# Patient Record
Sex: MALE | Race: WHITE | NOT HISPANIC OR LATINO | Employment: FULL TIME | URBAN - METROPOLITAN AREA
[De-identification: names, ages, dates, MRNs, and addresses within clinical notes are randomized per-mention and may not be internally consistent; named-entity substitution may affect disease eponyms.]

---

## 2018-11-12 ENCOUNTER — LAB VISIT (OUTPATIENT)
Dept: LAB | Facility: HOSPITAL | Age: 38
End: 2018-11-12
Attending: NEUROLOGICAL SURGERY
Payer: COMMERCIAL

## 2018-11-12 ENCOUNTER — OFFICE VISIT (OUTPATIENT)
Dept: NEUROSURGERY | Facility: CLINIC | Age: 38
End: 2018-11-12
Payer: COMMERCIAL

## 2018-11-12 VITALS
SYSTOLIC BLOOD PRESSURE: 139 MMHG | DIASTOLIC BLOOD PRESSURE: 73 MMHG | WEIGHT: 302.38 LBS | HEART RATE: 86 BPM | TEMPERATURE: 99 F

## 2018-11-12 DIAGNOSIS — D32.9 MENINGIOMA: Primary | ICD-10-CM

## 2018-11-12 DIAGNOSIS — D32.9 MENINGIOMA: ICD-10-CM

## 2018-11-12 LAB
CORTIS SERPL-MCNC: 6.6 UG/DL
FSH SERPL-ACNC: 2.5 MIU/ML
LH SERPL-ACNC: 5 MIU/ML
PROLACTIN SERPL IA-MCNC: 18.4 NG/ML
T4 FREE SERPL-MCNC: 0.89 NG/DL
TESTOST SERPL-MCNC: 333 NG/DL
TSH SERPL DL<=0.005 MIU/L-ACNC: 1.7 UIU/ML

## 2018-11-12 PROCEDURE — 82533 TOTAL CORTISOL: CPT

## 2018-11-12 PROCEDURE — 82024 ASSAY OF ACTH: CPT

## 2018-11-12 PROCEDURE — 84305 ASSAY OF SOMATOMEDIN: CPT

## 2018-11-12 PROCEDURE — 99204 OFFICE O/P NEW MOD 45 MIN: CPT | Mod: S$GLB,,, | Performed by: NEUROLOGICAL SURGERY

## 2018-11-12 PROCEDURE — 36415 COLL VENOUS BLD VENIPUNCTURE: CPT

## 2018-11-12 PROCEDURE — 83002 ASSAY OF GONADOTROPIN (LH): CPT

## 2018-11-12 PROCEDURE — 84439 ASSAY OF FREE THYROXINE: CPT

## 2018-11-12 PROCEDURE — 83001 ASSAY OF GONADOTROPIN (FSH): CPT

## 2018-11-12 PROCEDURE — 99999 PR PBB SHADOW E&M-NEW PATIENT-LVL III: CPT | Mod: PBBFAC,,, | Performed by: NEUROLOGICAL SURGERY

## 2018-11-12 PROCEDURE — 84443 ASSAY THYROID STIM HORMONE: CPT

## 2018-11-12 PROCEDURE — 84403 ASSAY OF TOTAL TESTOSTERONE: CPT

## 2018-11-12 PROCEDURE — 84146 ASSAY OF PROLACTIN: CPT

## 2018-11-12 RX ORDER — DIAZEPAM 10 MG/1
TABLET ORAL
COMMUNITY
Start: 2018-08-24 | End: 2018-12-18 | Stop reason: ALTCHOICE

## 2018-11-12 NOTE — PROGRESS NOTES
This office note has been dictated.  November 12, 2018    Allan Cantor M.D.  [address], [address]  [place] [address]    RE:  KARISSA FIGUEROA  [facility] No.:  [id number]    Dear Allan:    Karissa Figueroa was seen in neurosurgical consultation at the office this morning.    As you know, he is a 38-year-old man who began to complain of increased   headache several months ago.  Headaches are primarily occipital, but do project   forward toward the frontal area.  With the headaches, he also began to complain   of erectile dysfunction and pituitary study, hormonal studies were done.    Prolactin was borderline and this led to MRI of the brain being done showing a   pituitary area tumor.  He was then referred for neurosurgical evaluation.    Headaches have apparently been present for a long time, but increased in the   last several months.  He has noted no visual difficulties, no loss of peripheral   vision, no blurring or double vision.  He has had reduced hearing since he was   in [employer].  He has noted no difficulty speaking or swallowing.  No focal   weakness or numbness in extremities, difficulty with gait or balance. Past   medical history includes a spinal fusion for spinal trauma when he was in the   [employer].  He has no specific medical diseases such as diabetes or   hypertension.  He is a large individual.  He says his weight has remained   stable.  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  He is .    On examination, he is a well-developed, large, heavyset white male who is alert   and cooperative.  Examination of the head shows no specific tenderness over the   scalp or over the occipital nerves.  Eyes show full extraocular movements.    There is no nystagmus.  Pupils are equal and reactive to light.  Fundi show   clear disk margins.  He saw fingers moving in both peripheral visual fields.    Hearing is somewhat decreased, apparently  symmetrical.  He is moving the neck   freely.  On neurological examination, he is speaking clearly, provides a good   history.  Affect is normal.  Finger-to-nose was done well.  Gait is   unremarkable.  The remainder of the cranial nerve examination is normal.  He   shows normal facial sensation and movement.  The tongue protrudes in midline.    Strength in extremities is good.  Sensation normal.  Deep tendon reflexes are   symmetrical except the right patella is somewhat decreased.    MRI of the brain was done at [facility] in [place] on   08/24/18.  The brain is normally formed.  Ventricular size is normal.  The   pituitary gland and stalk appear unremarkable.  There is a small mass on the   tuberculum sellae which is the same density as brain on noncontrast scan and   intensely enhances.  This does not come back to the chiasm, but does separate   the optic nerves.  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    RECOMMENDATIONS:  The tumor is beginning to compress the optic nerve and removal   should be considered.  This would need to be done through a small craniotomy.    The patient would probably be out of work for about six weeks, although perhaps   he could be released to light duty earlier.  He would like to consider having   this done toward the end of December.  He will discuss this with his boss at   work and I will wait to hear from him as to how he wishes to proceed.    Thank you very much for the opportunity to see him in neurosurgical   consultation.    Sincerely yours,            ARTURO/WHITNEY  dd: 11/12/2018 10:01:37 (CST)  td: 11/13/2018 04:51:19 (CST)  Doc ID   #[id number]  Job ID #[id number]    CC: Salvatore Figueroa

## 2018-11-12 NOTE — LETTER
November 12, 2018      Allan Chambers MD  [address]  [facility]  [place] [address]           [facility] - Neurosurgery 7th Fl  [address]  [place] [address]  Phone: [phone]          Patient: Salvatore Figueroa   MR Number: [id number]   YOB: 1980   Date of Visit: 11/12/2018       Dear Dr. Allan Chambers:    Thank you for referring Salvatore Figueroa to me for evaluation. Attached you will find relevant portions of my assessment and plan of care.    If you have questions, please do not hesitate to call me. I look forward to following Salvatore Figueroa along with you.    Sincerely,    Miller Tucker MD    Enclosure  CC:  No Recipients    If you would like to receive this communication electronically, please contact [email or web address] or [phone] to request more information on [facility] Link access.    For providers and/or their staff who would like to refer a patient to [facility], please contact us through our one-stop-shop provider referral line, Alfred Madrigal, at [phone].    If you feel you have received this communication in error or would no longer like to receive these types of communications, please e-mail [email or web address]

## 2018-11-13 LAB — ACTH PLAS-MCNC: 52 PG/ML

## 2018-11-15 LAB
IGF-I SERPL-MCNC: 142 NG/ML (ref 48–292)
IGF-I Z-SCORE SERPL: 0 SD

## 2018-11-23 ENCOUNTER — TELEPHONE (OUTPATIENT)
Dept: NEUROSURGERY | Facility: CLINIC | Age: 38
End: 2018-11-23

## 2018-11-23 DIAGNOSIS — D32.9 MENINGIOMA: Primary | ICD-10-CM

## 2018-12-14 ENCOUNTER — ANESTHESIA EVENT (OUTPATIENT)
Dept: SURGERY | Facility: HOSPITAL | Age: 38
DRG: 027 | End: 2018-12-14
Payer: COMMERCIAL

## 2018-12-17 ENCOUNTER — TELEPHONE (OUTPATIENT)
Dept: NEUROSURGERY | Facility: CLINIC | Age: 38
End: 2018-12-17

## 2018-12-17 NOTE — TELEPHONE ENCOUNTER
----- Message from Donaldo Mckeon sent at 12/17/2018  8:35 AM CST -----  Contact: Pearl ( spouse ) @ [phone]  Caller requesting a return call from CECELIA regarding the lab order for pre op, pls call to discuss

## 2018-12-17 NOTE — TELEPHONE ENCOUNTER
SW PTS WIFE, LAB ORDERS WERE DISCUSSED AND ARE BEING DONE THIS MORNING. WILL BE HERE FOR MRI IN THE AM AT 8.

## 2018-12-18 ENCOUNTER — HOSPITAL ENCOUNTER (OUTPATIENT)
Dept: RADIOLOGY | Facility: HOSPITAL | Age: 38
Discharge: HOME | End: 2018-12-18
Attending: NEUROLOGICAL SURGERY
Payer: COMMERCIAL

## 2018-12-18 DIAGNOSIS — D32.9 MENINGIOMA: ICD-10-CM

## 2018-12-18 PROCEDURE — A9585 GADOBUTROL INJECTION: HCPCS | Performed by: NEUROLOGICAL SURGERY

## 2018-12-18 PROCEDURE — 70553 MRI BRAIN STEM W/O & W/DYE: CPT | Mod: TC

## 2018-12-18 PROCEDURE — 25500020 PHARM REV CODE 255: Performed by: NEUROLOGICAL SURGERY

## 2018-12-18 PROCEDURE — 70553 MRI BRAIN STEM W/O & W/DYE: CPT | Mod: 26,,, | Performed by: RADIOLOGY

## 2018-12-18 RX ORDER — GADOBUTROL 604.72 MG/ML
10 INJECTION INTRAVENOUS
Status: COMPLETED | OUTPATIENT
Start: 2018-12-18 | End: 2018-12-18

## 2018-12-18 RX ADMIN — GADOBUTROL 10 ML: 604.72 INJECTION INTRAVENOUS at 08:12

## 2018-12-18 NOTE — ANESTHESIA PREPROCEDURE EVALUATION
[facility]  Anesthesia Pre-Operative Evaluation         Patient Name: Salvatore Figueroa  YOB: 1980  MRN: [id number]    SUBJECTIVE:     Pre-operative evaluation for Procedure(s) (LRB):  CRANIOTOMY, USING FRAMELESS STEREOTAXY (Right)     12/18/2018    Salvatore Figueroa is a 38 y.o. male w/ a significant PMHx of recent meningioma dx who presents for the above procedure.    There is no problem list on file for this patient.      Review of patient's allergies indicates:  No Known Allergies    Current Inpatient Medications:      No current facility-administered medications on file prior to encounter.      Current Outpatient Medications on File Prior to Encounter   Medication Sig Dispense Refill    diazePAM (VALIUM) 10 MG Tab          Past Surgical History:   Procedure Laterality Date    HAND SURGERY Right 2008    SPINE SURGERY  2001       Social History     Socioeconomic History    Marital status:      Spouse name: Not on file    Number of children: Not on file    Years of education: Not on file    Highest education level: Not on file   Social Needs    Financial resource strain: Not on file    Food insecurity - worry: Not on file    Food insecurity - inability: Not on file    Transportation needs - medical: Not on file    Transportation needs - non-medical: Not on file   Occupational History    Not on file   Tobacco Use    Smoking status: Never Smoker    Smokeless tobacco: Never Used   Substance and Sexual Activity    Alcohol use: No     Frequency: Never    Drug use: No    Sexual activity: Yes     Partners: Female   Other Topics Concern    Not on file   Social History Narrative    Not on file       OBJECTIVE:     Vital Signs Range (Last 24H):         CBC:   Lab Results   Component Value Date    WBC 5.81 01/29/2007    HGB 14.3 01/29/2007    HCT 44.2 01/29/2007    MCV 82.9 01/29/2007     01/29/2007       CMP:   CMP  Sodium   Date Value Ref Range Status    01/29/2007 138 136 - 145 mMol/l Final     Potassium   Date Value Ref Range Status   01/29/2007 4.3 3.3 - 5.3 mMol/l Final     Chloride   Date Value Ref Range Status   01/29/2007 105 95 - 110 mMol/l Final     CO2   Date Value Ref Range Status   01/29/2007 26 23.0 - 29.0 mEq/L Final     Glucose   Date Value Ref Range Status   01/29/2007 104 70 - 110 mg/dl Final     BUN, Bld   Date Value Ref Range Status   01/29/2007 10 5 - 23 mg/dl Final     Creatinine   Date Value Ref Range Status   01/29/2007 1.4 0.5 - 1.4 mg/dl Final     Calcium   Date Value Ref Range Status   01/29/2007 9.2 8.7 - 10.5 mg/dl Final     Total Protein   Date Value Ref Range Status   01/29/2007 7.6 6.0 - 8.4 gm/dl Final     Albumin   Date Value Ref Range Status   01/29/2007 4.5 3.5 - 5.2 g/dl Final     Total Bilirubin   Date Value Ref Range Status   01/29/2007 0.6 0.1 - 1.0 mg/dl Final     Comment:     These are the guidelines recommended by the .  Especially  for infants and newborns, interpretation of results should be based  on gestational age, weight and in agreement with clinical  observations.  .  Premature Infant recommended reference range (Bilirubin, Total)  Up to 24 hours.............<8.0 mg/dl  Up to 48 hours............<12.0 mg/dl  3-5 days..................<15.0 mg/dl  6-29 days.................<15.0 mg/dl       Alkaline Phosphatase   Date Value Ref Range Status   01/29/2007 74 55 - 135 U/L Final     AST   Date Value Ref Range Status   01/29/2007 17 0 - 37 U/L Final     ALT   Date Value Ref Range Status   01/29/2007 26 0 - 40 U/L Final       INR:  No results for input(s): PT, INR, PROTIME, APTT in the last 72 hours.    Diagnostic Studies:   MRI brain 11/23/18  1.3 cm enhancing extra-axial mass centered over planum sphenoidale and suprasellar cistern most consistent with a meningioma as detailed above.    Incidental mild Chiari 1 malformation    8 mm pineal cyst    Mild paranasal sinus disease.    EKG: No recent studies  available.    2D ECHO:  No results found for this or any previous visit.      ASSESSMENT/PLAN:         Anesthesia Evaluation    I have reviewed the Patient Summary Reports.     I have reviewed the Medications.     Review of Systems  Anesthesia Hx:  History of prior surgery of interest to airway management or planning: Denies Family Hx of Anesthesia complications.   Denies Personal Hx of Anesthesia complications.   Hematology/Oncology:  Hematology Normal        Cardiovascular:  Cardiovascular Normal     Pulmonary:  Pulmonary Normal    Hepatic/GI:  Hepatic/GI Normal    Musculoskeletal:  Musculoskeletal Normal    Neurological:   meningioma   Endocrine:  Endocrine Normal        Physical Exam      Chest/Lungs:  Chest/Lungs Findings: Normal Respiratory Rate, Clear to auscultation     Heart/Vascular:  Heart Findings: Rate: Normal  Rhythm: Regular Rhythm     Abdomen:  Abdomen Findings:  Normal, Soft, Nontender            Anesthesia Plan  Type of Anesthesia, risks & benefits discussed:  Anesthesia Type:  general  Patient's Preference:   Intra-op Monitoring Plan: standard ASA monitors and arterial line  Intra-op Monitoring Plan Comments:   Post Op Pain Control Plan: multimodal analgesia, IV/PO Opioids PRN and per primary service following discharge from PACU  Post Op Pain Control Plan Comments:   Induction:   IV  Beta Blocker:  Patient is not currently on a Beta-Blocker (No further documentation required).       Informed Consent: Patient understands risks and agrees with Anesthesia plan.  Questions answered. Anesthesia consent signed with patient.  ASA Score: 2     Day of Surgery Review of History & Physical:    H&P update referred to the surgeon.         Ready For Surgery From Anesthesia Perspective.

## 2018-12-19 ENCOUNTER — ANESTHESIA (OUTPATIENT)
Dept: SURGERY | Facility: HOSPITAL | Age: 38
DRG: 027 | End: 2018-12-19
Payer: COMMERCIAL

## 2018-12-19 ENCOUNTER — HOSPITAL ENCOUNTER (INPATIENT)
Facility: HOSPITAL | Age: 38
LOS: 4 days | Discharge: HOME | DRG: 027 | End: 2018-12-23
Attending: NEUROLOGICAL SURGERY | Admitting: NEUROLOGICAL SURGERY
Payer: COMMERCIAL

## 2018-12-19 DIAGNOSIS — D32.9 MENINGIOMA: Primary | ICD-10-CM

## 2018-12-19 PROBLEM — I10 ESSENTIAL HYPERTENSION: Status: ACTIVE | Noted: 2018-12-19

## 2018-12-19 LAB
ABO + RH BLD: NORMAL
ANION GAP SERPL CALC-SCNC: 8 MMOL/L
APTT BLDCRRT: 23.3 SEC
BASOPHILS # BLD AUTO: 0.01 K/UL
BASOPHILS NFR BLD: 0.1 %
BLD GP AB SCN CELLS X3 SERPL QL: NORMAL
BUN SERPL-MCNC: 12 MG/DL
CALCIUM SERPL-MCNC: 8.6 MG/DL
CHLORIDE SERPL-SCNC: 111 MMOL/L
CO2 SERPL-SCNC: 19 MMOL/L
CREAT SERPL-MCNC: 1.4 MG/DL
DIFFERENTIAL METHOD: ABNORMAL
EOSINOPHIL # BLD AUTO: 0 K/UL
EOSINOPHIL NFR BLD: 0.1 %
ERYTHROCYTE [DISTWIDTH] IN BLOOD BY AUTOMATED COUNT: 13 %
EST. GFR  (AFRICAN AMERICAN): >60 ML/MIN/1.73 M^2
EST. GFR  (NON AFRICAN AMERICAN): >60 ML/MIN/1.73 M^2
GLUCOSE SERPL-MCNC: 123 MG/DL (ref 70–110)
GLUCOSE SERPL-MCNC: 170 MG/DL
HCO3 UR-SCNC: 19.6 MMOL/L (ref 24–28)
HCT VFR BLD AUTO: 42.1 %
HCT VFR BLD CALC: 39 %PCV (ref 36–54)
HGB BLD-MCNC: 14 G/DL
IMM GRANULOCYTES # BLD AUTO: 0.04 K/UL
IMM GRANULOCYTES NFR BLD AUTO: 0.5 %
INR PPP: 1
LYMPHOCYTES # BLD AUTO: 1.1 K/UL
LYMPHOCYTES NFR BLD: 12.2 %
MCH RBC QN AUTO: 27.9 PG
MCHC RBC AUTO-ENTMCNC: 33.3 G/DL
MCV RBC AUTO: 84 FL
MONOCYTES # BLD AUTO: 0.2 K/UL
MONOCYTES NFR BLD: 1.9 %
NEUTROPHILS # BLD AUTO: 7.5 K/UL
NEUTROPHILS NFR BLD: 85.2 %
NRBC BLD-RTO: 0 /100 WBC
PCO2 BLDA: 33.8 MMHG (ref 35–45)
PH SMN: 7.37 [PH] (ref 7.35–7.45)
PLATELET # BLD AUTO: 221 K/UL
PMV BLD AUTO: 10.2 FL
PO2 BLDA: 103 MMHG (ref 80–100)
POC BE: -6 MMOL/L
POC IONIZED CALCIUM: 1.13 MMOL/L (ref 1.06–1.42)
POC SATURATED O2: 98 % (ref 95–100)
POC TCO2: 21 MMOL/L (ref 23–27)
POTASSIUM BLD-SCNC: 4.9 MMOL/L (ref 3.5–5.1)
POTASSIUM SERPL-SCNC: 4.8 MMOL/L
PROTHROMBIN TIME: 10.1 SEC
RBC # BLD AUTO: 5.01 M/UL
SAMPLE: ABNORMAL
SODIUM BLD-SCNC: 140 MMOL/L (ref 136–145)
SODIUM SERPL-SCNC: 138 MMOL/L
WBC # BLD AUTO: 8.84 K/UL

## 2018-12-19 PROCEDURE — 99222 1ST HOSP IP/OBS MODERATE 55: CPT | Mod: ,,, | Performed by: PSYCHIATRY & NEUROLOGY

## 2018-12-19 PROCEDURE — 61512 CRNEC TREPH EXC MNGIOMA STTL: CPT | Mod: ,,, | Performed by: NEUROLOGICAL SURGERY

## 2018-12-19 PROCEDURE — 25000003 PHARM REV CODE 250: Performed by: PSYCHIATRY & NEUROLOGY

## 2018-12-19 PROCEDURE — 27201037 HC PRESSURE MONITORING SET UP

## 2018-12-19 PROCEDURE — 12000002 HC ACUTE/MED SURGE SEMI-PRIVATE ROOM

## 2018-12-19 PROCEDURE — 99222 PR INITIAL HOSPITAL CARE,LEVL II: ICD-10-PCS | Mod: ,,, | Performed by: PSYCHIATRY & NEUROLOGY

## 2018-12-19 PROCEDURE — 80048 BASIC METABOLIC PNL TOTAL CA: CPT

## 2018-12-19 PROCEDURE — 63600175 PHARM REV CODE 636 W HCPCS

## 2018-12-19 PROCEDURE — 82962 GLUCOSE BLOOD TEST: CPT | Performed by: NEUROLOGICAL SURGERY

## 2018-12-19 PROCEDURE — 71000039 HC RECOVERY, EACH ADD'L HOUR: Performed by: NEUROLOGICAL SURGERY

## 2018-12-19 PROCEDURE — 25000003 PHARM REV CODE 250: Performed by: NEUROLOGICAL SURGERY

## 2018-12-19 PROCEDURE — 88331 PATH CONSLTJ SURG 1 BLK 1SPC: CPT | Mod: 26,,, | Performed by: PATHOLOGY

## 2018-12-19 PROCEDURE — C1713 ANCHOR/SCREW BN/BN,TIS/BN: HCPCS | Performed by: NEUROLOGICAL SURGERY

## 2018-12-19 PROCEDURE — 25000003 PHARM REV CODE 250: Performed by: STUDENT IN AN ORGANIZED HEALTH CARE EDUCATION/TRAINING PROGRAM

## 2018-12-19 PROCEDURE — 25000003 PHARM REV CODE 250: Performed by: ANESTHESIOLOGY

## 2018-12-19 PROCEDURE — 37000009 HC ANESTHESIA EA ADD 15 MINS: Performed by: NEUROLOGICAL SURGERY

## 2018-12-19 PROCEDURE — 88307 TISSUE EXAM BY PATHOLOGIST: CPT | Mod: 26,,, | Performed by: PATHOLOGY

## 2018-12-19 PROCEDURE — 86850 RBC ANTIBODY SCREEN: CPT

## 2018-12-19 PROCEDURE — 61781 PR STEREOTACTIC COMP ASSIST PROC,CRANIAL,INTRADURAL: ICD-10-PCS | Mod: ,,, | Performed by: NEUROLOGICAL SURGERY

## 2018-12-19 PROCEDURE — 88331 PATH CONSLTJ SURG 1 BLK 1SPC: CPT | Performed by: PATHOLOGY

## 2018-12-19 PROCEDURE — 36000713 HC OR TIME LEV V EA ADD 15 MIN: Performed by: NEUROLOGICAL SURGERY

## 2018-12-19 PROCEDURE — 61512 PR EXCIS SUPRATENT MENINGIOMA: ICD-10-PCS | Mod: ,,, | Performed by: NEUROLOGICAL SURGERY

## 2018-12-19 PROCEDURE — 63600175 PHARM REV CODE 636 W HCPCS: Performed by: STUDENT IN AN ORGANIZED HEALTH CARE EDUCATION/TRAINING PROGRAM

## 2018-12-19 PROCEDURE — 63600175 PHARM REV CODE 636 W HCPCS: Performed by: NEUROLOGICAL SURGERY

## 2018-12-19 PROCEDURE — 85610 PROTHROMBIN TIME: CPT

## 2018-12-19 PROCEDURE — 88331 TISSUE SPECIMEN TO PATHOLOGY - SURGERY: ICD-10-PCS | Mod: 26,,, | Performed by: PATHOLOGY

## 2018-12-19 PROCEDURE — 94761 N-INVAS EAR/PLS OXIMETRY MLT: CPT

## 2018-12-19 PROCEDURE — C9113 INJ PANTOPRAZOLE SODIUM, VIA: HCPCS | Performed by: STUDENT IN AN ORGANIZED HEALTH CARE EDUCATION/TRAINING PROGRAM

## 2018-12-19 PROCEDURE — 69990 PR MICROSURG TECHNIQUES,REQ OPER MICROSCOPE: ICD-10-PCS | Mod: 59,,, | Performed by: NEUROLOGICAL SURGERY

## 2018-12-19 PROCEDURE — 37000008 HC ANESTHESIA 1ST 15 MINUTES: Performed by: NEUROLOGICAL SURGERY

## 2018-12-19 PROCEDURE — D9220A PRA ANESTHESIA: Mod: ,,, | Performed by: ANESTHESIOLOGY

## 2018-12-19 PROCEDURE — 85730 THROMBOPLASTIN TIME PARTIAL: CPT

## 2018-12-19 PROCEDURE — 88307 TISSUE SPECIMEN TO PATHOLOGY - SURGERY: ICD-10-PCS | Mod: 26,,, | Performed by: PATHOLOGY

## 2018-12-19 PROCEDURE — 20000000 HC ICU ROOM

## 2018-12-19 PROCEDURE — 85025 COMPLETE CBC W/AUTO DIFF WBC: CPT

## 2018-12-19 PROCEDURE — C1729 CATH, DRAINAGE: HCPCS | Performed by: NEUROLOGICAL SURGERY

## 2018-12-19 PROCEDURE — 36415 COLL VENOUS BLD VENIPUNCTURE: CPT

## 2018-12-19 PROCEDURE — 63600175 PHARM REV CODE 636 W HCPCS: Performed by: ANESTHESIOLOGY

## 2018-12-19 PROCEDURE — 69990 MICROSURGERY ADD-ON: CPT | Mod: 59,,, | Performed by: NEUROLOGICAL SURGERY

## 2018-12-19 PROCEDURE — 36000712 HC OR TIME LEV V 1ST 15 MIN: Performed by: NEUROLOGICAL SURGERY

## 2018-12-19 PROCEDURE — 27000221 HC OXYGEN, UP TO 24 HOURS

## 2018-12-19 PROCEDURE — 61781 SCAN PROC CRANIAL INTRA: CPT | Mod: ,,, | Performed by: NEUROLOGICAL SURGERY

## 2018-12-19 PROCEDURE — 86920 COMPATIBILITY TEST SPIN: CPT

## 2018-12-19 PROCEDURE — 71000033 HC RECOVERY, INTIAL HOUR: Performed by: NEUROLOGICAL SURGERY

## 2018-12-19 PROCEDURE — 36620 INSERTION CATHETER ARTERY: CPT | Mod: 59,,, | Performed by: ANESTHESIOLOGY

## 2018-12-19 PROCEDURE — 27201423 OPTIME MED/SURG SUP & DEVICES STERILE SUPPLY: Performed by: NEUROLOGICAL SURGERY

## 2018-12-19 DEVICE — IMPLANTABLE DEVICE: Type: IMPLANTABLE DEVICE | Site: CRANIAL | Status: FUNCTIONAL

## 2018-12-19 DEVICE — PLATE BONE 2X2 HOLE SM BOX: Type: IMPLANTABLE DEVICE | Site: CRANIAL | Status: FUNCTIONAL

## 2018-12-19 RX ORDER — SODIUM CHLORIDE 9 MG/ML
INJECTION, SOLUTION INTRAVENOUS CONTINUOUS PRN
Status: DISCONTINUED | OUTPATIENT
Start: 2018-12-19 | End: 2018-12-19

## 2018-12-19 RX ORDER — DEXAMETHASONE SODIUM PHOSPHATE 4 MG/ML
INJECTION, SOLUTION INTRA-ARTICULAR; INTRALESIONAL; INTRAMUSCULAR; INTRAVENOUS; SOFT TISSUE
Status: DISCONTINUED | OUTPATIENT
Start: 2018-12-19 | End: 2018-12-19

## 2018-12-19 RX ORDER — ONDANSETRON 8 MG/1
8 TABLET, ORALLY DISINTEGRATING ORAL EVERY 4 HOURS PRN
Status: DISCONTINUED | OUTPATIENT
Start: 2018-12-19 | End: 2018-12-23 | Stop reason: HOSPADM

## 2018-12-19 RX ORDER — ONDANSETRON 2 MG/ML
INJECTION INTRAMUSCULAR; INTRAVENOUS
Status: DISCONTINUED | OUTPATIENT
Start: 2018-12-19 | End: 2018-12-19

## 2018-12-19 RX ORDER — OXYBUTYNIN CHLORIDE 5 MG/1
5 TABLET ORAL 3 TIMES DAILY
Status: DISCONTINUED | OUTPATIENT
Start: 2018-12-19 | End: 2018-12-19

## 2018-12-19 RX ORDER — MUPIROCIN 20 MG/G
OINTMENT TOPICAL
Status: DISCONTINUED | OUTPATIENT
Start: 2018-12-19 | End: 2018-12-19

## 2018-12-19 RX ORDER — PROPOFOL 10 MG/ML
VIAL (ML) INTRAVENOUS CONTINUOUS PRN
Status: DISCONTINUED | OUTPATIENT
Start: 2018-12-19 | End: 2018-12-19

## 2018-12-19 RX ORDER — SUCCINYLCHOLINE CHLORIDE 20 MG/ML
INJECTION INTRAMUSCULAR; INTRAVENOUS
Status: DISCONTINUED | OUTPATIENT
Start: 2018-12-19 | End: 2018-12-19

## 2018-12-19 RX ORDER — SODIUM CHLORIDE 9 MG/ML
INJECTION, SOLUTION INTRAVENOUS CONTINUOUS
Status: DISCONTINUED | OUTPATIENT
Start: 2018-12-19 | End: 2018-12-19

## 2018-12-19 RX ORDER — ACETAMINOPHEN 10 MG/ML
INJECTION, SOLUTION INTRAVENOUS
Status: DISCONTINUED | OUTPATIENT
Start: 2018-12-19 | End: 2018-12-19

## 2018-12-19 RX ORDER — IBUPROFEN 200 MG
24 TABLET ORAL
Status: DISCONTINUED | OUTPATIENT
Start: 2018-12-19 | End: 2018-12-23 | Stop reason: HOSPADM

## 2018-12-19 RX ORDER — PHENYTOIN SODIUM 50 MG/ML
INJECTION INTRAMUSCULAR; INTRAVENOUS
Status: DISCONTINUED | OUTPATIENT
Start: 2018-12-19 | End: 2018-12-19

## 2018-12-19 RX ORDER — ACETAMINOPHEN 650 MG/1
650 SUPPOSITORY RECTAL EVERY 6 HOURS PRN
Status: DISCONTINUED | OUTPATIENT
Start: 2018-12-19 | End: 2018-12-23 | Stop reason: HOSPADM

## 2018-12-19 RX ORDER — NICARDIPINE HYDROCHLORIDE 0.2 MG/ML
INJECTION INTRAVENOUS
Status: DISPENSED
Start: 2018-12-19 | End: 2018-12-20

## 2018-12-19 RX ORDER — METHOCARBAMOL 500 MG/1
500 TABLET, FILM COATED ORAL ONCE
Status: COMPLETED | OUTPATIENT
Start: 2018-12-19 | End: 2018-12-19

## 2018-12-19 RX ORDER — HYDROCODONE BITARTRATE AND ACETAMINOPHEN 5; 325 MG/1; MG/1
1 TABLET ORAL EVERY 4 HOURS PRN
Status: DISCONTINUED | OUTPATIENT
Start: 2018-12-19 | End: 2018-12-20

## 2018-12-19 RX ORDER — BACITRACIN ZINC 500 UNIT/G
OINTMENT (GRAM) TOPICAL
Status: DISCONTINUED | OUTPATIENT
Start: 2018-12-19 | End: 2018-12-19

## 2018-12-19 RX ORDER — GLUCAGON 1 MG
1 KIT INJECTION
Status: DISCONTINUED | OUTPATIENT
Start: 2018-12-19 | End: 2018-12-23 | Stop reason: HOSPADM

## 2018-12-19 RX ORDER — LIDOCAINE HCL/PF 100 MG/5ML
SYRINGE (ML) INTRAVENOUS
Status: DISCONTINUED | OUTPATIENT
Start: 2018-12-19 | End: 2018-12-19

## 2018-12-19 RX ORDER — ACETAMINOPHEN 325 MG/1
650 TABLET ORAL EVERY 6 HOURS PRN
Status: DISCONTINUED | OUTPATIENT
Start: 2018-12-19 | End: 2018-12-23 | Stop reason: HOSPADM

## 2018-12-19 RX ORDER — SODIUM CHLORIDE 0.9 % (FLUSH) 0.9 %
3 SYRINGE (ML) INJECTION
Status: DISCONTINUED | OUTPATIENT
Start: 2018-12-19 | End: 2018-12-19

## 2018-12-19 RX ORDER — ONDANSETRON 2 MG/ML
4 INJECTION INTRAMUSCULAR; INTRAVENOUS ONCE
Status: COMPLETED | OUTPATIENT
Start: 2018-12-19 | End: 2018-12-19

## 2018-12-19 RX ORDER — MIDAZOLAM HYDROCHLORIDE 1 MG/ML
INJECTION, SOLUTION INTRAMUSCULAR; INTRAVENOUS
Status: DISCONTINUED | OUTPATIENT
Start: 2018-12-19 | End: 2018-12-19

## 2018-12-19 RX ORDER — ONDANSETRON 2 MG/ML
INJECTION INTRAMUSCULAR; INTRAVENOUS
Status: COMPLETED
Start: 2018-12-19 | End: 2018-12-19

## 2018-12-19 RX ORDER — LIDOCAINE HYDROCHLORIDE AND EPINEPHRINE 10; 10 MG/ML; UG/ML
INJECTION, SOLUTION INFILTRATION; PERINEURAL
Status: DISCONTINUED | OUTPATIENT
Start: 2018-12-19 | End: 2018-12-19

## 2018-12-19 RX ORDER — FENTANYL CITRATE 50 UG/ML
INJECTION, SOLUTION INTRAMUSCULAR; INTRAVENOUS
Status: DISCONTINUED | OUTPATIENT
Start: 2018-12-19 | End: 2018-12-19

## 2018-12-19 RX ORDER — BACITRACIN 50000 [IU]/1
INJECTION, POWDER, FOR SOLUTION INTRAMUSCULAR
Status: DISCONTINUED | OUTPATIENT
Start: 2018-12-19 | End: 2018-12-19

## 2018-12-19 RX ORDER — PANTOPRAZOLE SODIUM 40 MG/10ML
40 INJECTION, POWDER, LYOPHILIZED, FOR SOLUTION INTRAVENOUS DAILY
Status: DISCONTINUED | OUTPATIENT
Start: 2018-12-19 | End: 2018-12-19

## 2018-12-19 RX ORDER — HYDROMORPHONE HYDROCHLORIDE 1 MG/ML
0.2 INJECTION, SOLUTION INTRAMUSCULAR; INTRAVENOUS; SUBCUTANEOUS EVERY 5 MIN PRN
Status: DISCONTINUED | OUTPATIENT
Start: 2018-12-19 | End: 2018-12-20

## 2018-12-19 RX ORDER — OXYBUTYNIN CHLORIDE 5 MG/1
TABLET ORAL
Status: DISPENSED
Start: 2018-12-19 | End: 2018-12-20

## 2018-12-19 RX ORDER — IBUPROFEN 200 MG
16 TABLET ORAL
Status: DISCONTINUED | OUTPATIENT
Start: 2018-12-19 | End: 2018-12-23 | Stop reason: HOSPADM

## 2018-12-19 RX ORDER — ROCURONIUM BROMIDE 10 MG/ML
INJECTION, SOLUTION INTRAVENOUS
Status: DISCONTINUED | OUTPATIENT
Start: 2018-12-19 | End: 2018-12-19

## 2018-12-19 RX ORDER — OXYBUTYNIN CHLORIDE 5 MG/1
5 TABLET ORAL 2 TIMES DAILY
Status: COMPLETED | OUTPATIENT
Start: 2018-12-19 | End: 2018-12-20

## 2018-12-19 RX ORDER — MUPIROCIN 20 MG/G
1 OINTMENT TOPICAL 2 TIMES DAILY
Status: DISCONTINUED | OUTPATIENT
Start: 2018-12-19 | End: 2018-12-23 | Stop reason: HOSPADM

## 2018-12-19 RX ORDER — PROPOFOL 10 MG/ML
VIAL (ML) INTRAVENOUS
Status: DISCONTINUED | OUTPATIENT
Start: 2018-12-19 | End: 2018-12-19

## 2018-12-19 RX ORDER — NICARDIPINE HYDROCHLORIDE 0.2 MG/ML
1 INJECTION INTRAVENOUS CONTINUOUS
Status: DISCONTINUED | OUTPATIENT
Start: 2018-12-19 | End: 2018-12-20

## 2018-12-19 RX ORDER — MUPIROCIN 20 MG/G
1 OINTMENT TOPICAL
Status: DISCONTINUED | OUTPATIENT
Start: 2018-12-19 | End: 2018-12-19

## 2018-12-19 RX ORDER — SODIUM CHLORIDE 9 MG/ML
INJECTION, SOLUTION INTRAVENOUS CONTINUOUS
Status: DISCONTINUED | OUTPATIENT
Start: 2018-12-19 | End: 2018-12-23 | Stop reason: HOSPADM

## 2018-12-19 RX ORDER — HEPARIN SODIUM 5000 [USP'U]/ML
5000 INJECTION, SOLUTION INTRAVENOUS; SUBCUTANEOUS EVERY 8 HOURS
Status: DISCONTINUED | OUTPATIENT
Start: 2018-12-20 | End: 2018-12-23 | Stop reason: HOSPADM

## 2018-12-19 RX ADMIN — PHENYTOIN SODIUM 500 MG: 50 INJECTION INTRAMUSCULAR; INTRAVENOUS at 09:12

## 2018-12-19 RX ADMIN — PROPOFOL 150 MCG/KG/MIN: 10 INJECTION, EMULSION INTRAVENOUS at 08:12

## 2018-12-19 RX ADMIN — ONDANSETRON 4 MG: 2 INJECTION INTRAMUSCULAR; INTRAVENOUS at 05:12

## 2018-12-19 RX ADMIN — REMIFENTANIL HYDROCHLORIDE 0.25 MCG/KG/MIN: 1 INJECTION, POWDER, LYOPHILIZED, FOR SOLUTION INTRAVENOUS at 12:12

## 2018-12-19 RX ADMIN — SODIUM CHLORIDE: 0.9 INJECTION, SOLUTION INTRAVENOUS at 08:12

## 2018-12-19 RX ADMIN — SODIUM CHLORIDE, SODIUM GLUCONATE, SODIUM ACETATE, POTASSIUM CHLORIDE, MAGNESIUM CHLORIDE, SODIUM PHOSPHATE, DIBASIC, AND POTASSIUM PHOSPHATE: .53; .5; .37; .037; .03; .012; .00082 INJECTION, SOLUTION INTRAVENOUS at 08:12

## 2018-12-19 RX ADMIN — DEXAMETHASONE SODIUM PHOSPHATE 12 MG: 4 INJECTION, SOLUTION INTRAMUSCULAR; INTRAVENOUS at 08:12

## 2018-12-19 RX ADMIN — HYDROCODONE BITARTRATE AND ACETAMINOPHEN 1 TABLET: 5; 325 TABLET ORAL at 04:12

## 2018-12-19 RX ADMIN — CEFTRIAXONE 2 G: 2 INJECTION, SOLUTION INTRAVENOUS at 08:12

## 2018-12-19 RX ADMIN — HYDROCODONE BITARTRATE AND ACETAMINOPHEN 1 TABLET: 5; 325 TABLET ORAL at 09:12

## 2018-12-19 RX ADMIN — LIDOCAINE HYDROCHLORIDE 100 MG: 20 INJECTION, SOLUTION INTRAVENOUS at 08:12

## 2018-12-19 RX ADMIN — OXYBUTYNIN CHLORIDE 5 MG: 5 TABLET ORAL at 05:12

## 2018-12-19 RX ADMIN — PANTOPRAZOLE SODIUM 40 MG: 40 INJECTION, POWDER, FOR SOLUTION INTRAVENOUS at 02:12

## 2018-12-19 RX ADMIN — REMIFENTANIL HYDROCHLORIDE 0.3 MCG/KG/MIN: 1 INJECTION, POWDER, LYOPHILIZED, FOR SOLUTION INTRAVENOUS at 08:12

## 2018-12-19 RX ADMIN — MUPIROCIN 1 G: 20 OINTMENT TOPICAL at 08:12

## 2018-12-19 RX ADMIN — ROCURONIUM BROMIDE 5 MG: 10 INJECTION, SOLUTION INTRAVENOUS at 08:12

## 2018-12-19 RX ADMIN — SODIUM CHLORIDE: 0.9 INJECTION, SOLUTION INTRAVENOUS at 02:12

## 2018-12-19 RX ADMIN — MUPIROCIN: 20 OINTMENT TOPICAL at 06:12

## 2018-12-19 RX ADMIN — ONDANSETRON 4 MG: 2 INJECTION INTRAMUSCULAR; INTRAVENOUS at 01:12

## 2018-12-19 RX ADMIN — MIDAZOLAM HYDROCHLORIDE 2 MG: 1 INJECTION, SOLUTION INTRAMUSCULAR; INTRAVENOUS at 08:12

## 2018-12-19 RX ADMIN — PROPOFOL 200 MG: 10 INJECTION, EMULSION INTRAVENOUS at 08:12

## 2018-12-19 RX ADMIN — FENTANYL CITRATE 100 MCG: 50 INJECTION, SOLUTION INTRAMUSCULAR; INTRAVENOUS at 08:12

## 2018-12-19 RX ADMIN — NICARDIPINE HYDROCHLORIDE 5 MG/HR: 0.2 INJECTION, SOLUTION INTRAVENOUS at 10:12

## 2018-12-19 RX ADMIN — ACETAMINOPHEN 650 MG: 325 TABLET, FILM COATED ORAL at 08:12

## 2018-12-19 RX ADMIN — METHOCARBAMOL TABLETS 500 MG: 500 TABLET, COATED ORAL at 05:12

## 2018-12-19 RX ADMIN — NICARDIPINE HYDROCHLORIDE 2.5 MG/HR: 0.2 INJECTION, SOLUTION INTRAVENOUS at 02:12

## 2018-12-19 RX ADMIN — ACETAMINOPHEN 1000 MG: 10 INJECTION, SOLUTION INTRAVENOUS at 08:12

## 2018-12-19 RX ADMIN — SUCCINYLCHOLINE CHLORIDE 120 MG: 20 INJECTION, SOLUTION INTRAMUSCULAR; INTRAVENOUS at 08:12

## 2018-12-19 RX ADMIN — SODIUM CHLORIDE 0.2 MCG/KG/MIN: 9 INJECTION, SOLUTION INTRAVENOUS at 08:12

## 2018-12-19 NOTE — ANESTHESIA PROCEDURE NOTES
Arterial    Diagnosis: meningioma    Patient location during procedure: done in OR  Procedure start time: 12/19/2018 8:12 AM  Timeout: 12/19/2018 8:11 AM  Procedure end time: 12/19/2018 8:16 AM  Staffing  Anesthesiologist: Bam Pedraza Jr., MD  Resident/CRNA: Polly Scales MD  Performed: resident/CRNA   Anesthesiologist was present at the time of the procedure.  Preanesthetic Checklist  Completed: patient identified, site marked, surgical consent, pre-op evaluation, timeout performed, IV checked, risks and benefits discussed, monitors and equipment checked and anesthesia consent givenArterial  Skin Prep: chlorhexidine gluconate  Local Infiltration: none  Orientation: left  Location: radial  Catheter Size: 20 G  Catheter placement by Anatomical landmarks. Heme positive aspiration all ports.Insertion Attempts: 1  Assessment  Dressing: secured with tape and tegaderm and secured with tape  Patient: Tolerated well

## 2018-12-19 NOTE — H&P
[facility]  Neurocritical Care  History & Physical    Admit Date: 12/19/2018  Service Date: 12/19/2018  Length of Stay: 0    Subjective:     Chief Complaint: Mass    History of Present Illness: 38 year old male presenting s/p meningioma resection. Patient presented with persistent headaches and erectile dysfunction and was found to have tuberculum sellae meningioma 1.3 cm tuberculum sellae meningioma. Surgery was reportedly uneventful. Admitted to [facility] with SG drain in place.    Past Medical History:   Diagnosis Date    Arthritis     Chronic back pain     Chronic back pain     Meningioma      Past Surgical History:   Procedure Laterality Date    HAND SURGERY Right 2008    SPINE SURGERY  2001      No current facility-administered medications on file prior to encounter.      No current outpatient medications on file prior to encounter.      Allergies: Patient has no known allergies.    History reviewed. No pertinent family history.  Social History     Tobacco Use    Smoking status: Never Smoker    Smokeless tobacco: Never Used   Substance Use Topics    Alcohol use: No     Frequency: Never    Drug use: No     Review of Systems   Constitutional: Negative for fever.   HENT: Negative for trouble swallowing.    Eyes: Negative for visual disturbance.   Respiratory: Negative for shortness of breath.    Cardiovascular: Negative for chest pain.   Gastrointestinal: Negative for nausea.   Genitourinary: Positive for difficulty urinating.   Musculoskeletal: Positive for back pain. Negative for neck pain and neck stiffness.   Neurological: Positive for headaches. Negative for weakness and numbness.     Objective:     Vitals:    Temp: 98.7 °F (37.1 °C)  Pulse: 110  Rhythm: sinus tachycardia  BP: (!) 144/79  MAP (mmHg): 105  Resp: (!) 30  SpO2: 99 %  O2 Device (Oxygen Therapy): Room Humidifier    Temp  Min: 98.4 °F (36.9 °C)  Max: 98.7 °F (37.1 °C)  Pulse  Min: 77  Max: 110  BP  Min: 119/57  Max:  149/79  MAP (mmHg)  Min: 82  Max: 105  Resp  Min: 18  Max: 30  SpO2  Min: 92 %  Max: 100 %    No intake/output data recorded.           Physical Exam   Constitutional: He appears well-developed and well-nourished. He appears lethargic.   HENT:   SG drain in place   Eyes: EOM are normal. Pupils are equal, round, and reactive to light.   Neck: Normal range of motion. Neck supple.   Cardiovascular: Normal rate.   Pulmonary/Chest: Effort normal.   Abdominal: Soft. He exhibits no distension.   Neurological: He has normal strength. He appears lethargic. No cranial nerve deficit or sensory deficit. GCS eye subscore is 4. GCS verbal subscore is 5. GCS motor subscore is 6.         Assessment/Plan:     Cardiac/Vascular   Essential hypertension    Sarah  foreign     Oncology   Meningioma    tuberculum sellae meningioma  -s/p resection  -SG drain in place  -HOB>30  --140  -pain management  -PT/OT/ST  -admit to [facility] for neurochecks  -sarah  -abx while drain in place           The patient is being Prophylaxed for:  Venous Thromboembolism with: Mechanical or Chemical  Stress Ulcer with: Not Applicable   Ventilator Pneumonia with: not applicable    Activity Orders          None        No Order    Deon Barboza MD  Neurocritical Care  [facility]

## 2018-12-19 NOTE — HPI
38 year old male presenting s/p meningioma resection. Patient presented with persistent headaches and erectile dysfunction and was found to have tuberculum sellae meningioma 1.3 cm tuberculum sellae meningioma. Surgery was reportedly uneventful. Admitted to [facility] with SG drain in place.

## 2018-12-19 NOTE — H&P
History and Physical  Neurosurgery    Admit Date: 12/19/2018  LOS: 0    Code Status: No Order     CC: <principal problem not specified>    SUBJECTIVE:     History of Present Illness: 37 yo male with known sellar mass presenting for elective craniotomy for resection.    Neurologically stable.           OBJECTIVE:   Vital Signs (Most Recent):   Temp: 98.4 °F (36.9 °C) (12/19/18 0633)  Pulse: 77 (12/19/18 0633)  Resp: 18 (12/19/18 0633)  BP: 126/63 (12/19/18 0633)  SpO2: 97 % (12/19/18 0633)    Vital Signs (24h Range):   Temp:  [98.4 °F (36.9 °C)] 98.4 °F (36.9 °C)  Pulse:  [77] 77  Resp:  [18] 18  SpO2:  [97 %] 97 %  BP: (126)/(63) 126/63      I & O (Last 24h):  No intake or output data in the 24 hours ending 12/19/18 0806    Physical Exam:  General: well developed, well nourished, no distress.   Head: normocephalic, atraumatic  Cervical Spine: No midline tenderness to palpation.  Thoracolumbosacral Spine: No midline tenderness to palpation.  GCS: Motor: 6/Verbal: 5/Eyes: 4 GCS Total: 15  Mental Status: Awake, Alert, Oriented x 4  Language: No aphasia  Speech: No dysarthria  Facial Droop: None   Cranial nerves: CN III-XII grossly intact.  Eyes: Pupils equal and reactive to light. Intact Conjugate horizontal and vertical pursuit. No nystagmus. No gaze deviation.   Pulmonary: No distress.  Sensory: No deficit.  Propioception: No deficit in 1st digit of toe bilaterally.  Rectal Tone: Not tested.  Drift: None.  Upper Extremity Ataxia: No Dysmetria Bilaterally  Lower Extremity Ataxia: Not tested.  Dysdiadochokinesia: Not tested.  Reflexes: 2+ patellar bilaterally.  Vazquez: Absent  Clonus: Absent  Babinski: Absent  Romberg: Not Tested  Pulses: Brisk and symmetric radial, DP and tibial pulses.  Motor Strength:    Strength  Shoulder Abduction Elbow Extension Elbow Flexion Wrist Extension Wrist Flexion Finger Opposition Finger Add Finger Abd   Upper: R 5/5 5/5 5/5 5/5 5/5 5/5 5/5 5/5    L 5/5 5/5 5/5 5/5 5/5 5/5 5/5 5/5      Hip Flexion Knee Extension Knee  Flexion Ankle Dflexion Ankle Pflexion EHL     Lower: R 5/5 5/5 5/5 5/5 5/5 5/5      L 5/5 5/5 5/5 5/5 5/5 5/5           Lines/Drains/Airway:          Nutrition/Tube Feeds:   Current Diet Order   Procedures    Diet NPO       Labs:  ABG: No results for input(s): PH, PO2, PCO2, HCO3, POCSATURATED, BE in the last 24 hours.  BMP:No results for input(s): NA, K, CL, CO2, BUN, CREATININE, GLU, MG, PHOS in the last 24 hours.  LFT:   Lab Results   Component Value Date    AST 17 01/29/2007    ALT 26 01/29/2007    ALKPHOS 74 01/29/2007    BILITOT 0.6 01/29/2007    ALBUMIN 4.5 01/29/2007    PROT 7.6 01/29/2007     CBC:   Lab Results   Component Value Date    WBC 5.81 01/29/2007    HGB 14.3 01/29/2007    HCT 44.2 01/29/2007    MCV 82.9 01/29/2007     01/29/2007     Microbiology x 7d:   Microbiology Results (last 7 days)     ** No results found for the last 168 hours. **            ASSESSMENT/PLAN:   37 yo male with known sellar mass presenting for elective craniotomy for resection.    --To OR for resection of sellar mass.   --Admit to neurosurgery under floor status.

## 2018-12-19 NOTE — PROGRESS NOTES
Dr. Barboza at bedside, aware of pts complaints of back pain and discomfort r/t maza catheter.  Dr. Castaneda states he will put in an order for medication for back spasms.  Will continue to monitor

## 2018-12-19 NOTE — TRANSFER OF CARE
"Anesthesia Transfer of Care Note    Patient: Salvatore Figueroa    Procedure(s) Performed: Procedure(s) (LRB):  CRANIOTOMY, USING FRAMELESS STEREOTAXY, Meningioma (Right)    Patient location: PACU    Anesthesia Type: general    Transport from OR: Transported from OR on 2-3 L/min O2 by NC with adequate spontaneous ventilation    Post pain: adequate analgesia    Post assessment: no apparent anesthetic complications    Post vital signs: stable    Level of consciousness: awake    Nausea/Vomiting: no nausea/vomiting    Complications: none    Transfer of care protocol was followed      Last vitals:   Visit Vitals  /74   Pulse 95   Temp 37.1 °C (98.7 °F) (Axillary)   Resp 18   Ht 6' 1" (1.854 m)   Wt 134.3 kg (296 lb)   SpO2 (!) 92%   BMI 39.05 kg/m²     "

## 2018-12-19 NOTE — ASSESSMENT & PLAN NOTE
tuberculum sellae meningioma  -s/p resection  -SG drain in place  -HOB>30  --140  -pain management  -PT/OT/ST  -admit to [facility] for neurochecks  -rodo  -abx while drain in place

## 2018-12-19 NOTE — SUBJECTIVE & OBJECTIVE
Past Medical History:   Diagnosis Date    Arthritis     Chronic back pain     Chronic back pain     Meningioma      Past Surgical History:   Procedure Laterality Date    HAND SURGERY Right 2008    SPINE SURGERY  2001      No current facility-administered medications on file prior to encounter.      No current outpatient medications on file prior to encounter.      Allergies: Patient has no known allergies.    History reviewed. No pertinent family history.  Social History     Tobacco Use    Smoking status: Never Smoker    Smokeless tobacco: Never Used   Substance Use Topics    Alcohol use: No     Frequency: Never    Drug use: No     Review of Systems   Constitutional: Negative for fever.   HENT: Negative for trouble swallowing.    Eyes: Negative for visual disturbance.   Respiratory: Negative for shortness of breath.    Cardiovascular: Negative for chest pain.   Gastrointestinal: Negative for nausea.   Genitourinary: Positive for difficulty urinating.   Musculoskeletal: Positive for back pain. Negative for neck pain and neck stiffness.   Neurological: Positive for headaches. Negative for weakness and numbness.     Objective:     Vitals:    Temp: 98.7 °F (37.1 °C)  Pulse: 110  Rhythm: sinus tachycardia  BP: (!) 144/79  MAP (mmHg): 105  Resp: (!) 30  SpO2: 99 %  O2 Device (Oxygen Therapy): Room Humidifier    Temp  Min: 98.4 °F (36.9 °C)  Max: 98.7 °F (37.1 °C)  Pulse  Min: 77  Max: 110  BP  Min: 119/57  Max: 149/79  MAP (mmHg)  Min: 82  Max: 105  Resp  Min: 18  Max: 30  SpO2  Min: 92 %  Max: 100 %    No intake/output data recorded.           Physical Exam   Constitutional: He appears well-developed and well-nourished. He appears lethargic.   HENT:   SG drain in place   Eyes: EOM are normal. Pupils are equal, round, and reactive to light.   Neck: Normal range of motion. Neck supple.   Cardiovascular: Normal rate.   Pulmonary/Chest: Effort normal.   Abdominal: Soft. He exhibits no distension.   Neurological: He  has normal strength. He appears lethargic. No cranial nerve deficit or sensory deficit. GCS eye subscore is 4. GCS verbal subscore is 5. GCS motor subscore is 6.

## 2018-12-19 NOTE — PRE-PROCEDURE INSTRUCTIONS
"Spoke with Patient.  NPO, medication, and pre-op instructions reviewed.  Denies previous problems with Anesthesia.  Has chronic back pain and has been having chronic headaches.  Stated that he has "a high threshold for pain and nausea."  Verbalized understanding of instructions.    "

## 2018-12-19 NOTE — BRIEF OP NOTE
[facility]  Brief Operative Note    SUMMARY     Surgery Date: 12/19/2018     Surgeon(s) and Role:     * Miller Tucker MD - Primary     * Kike Elaine MD - Resident - Assisting        Pre-op Diagnosis:  Meningioma [D32.9]    Post-op Diagnosis:  Post-Op Diagnosis Codes:     * Meningioma [D32.9]    Procedure:    Right frontotemporal craniotomy, excision of tuberculum sellae meningioma with neuronavigation and microsurgery.    Anesthesia: General    Description of Procedure: craniotomy for meningioma    Description of the findings of the procedure: Tumor uncerneath optic nerves and chiasm, above pituitary stalk.    Estimated Blood Loss: 300 mL         Specimens:   Specimen (12h ago, onward)    Start     Ordered    12/19/18 1234  Specimen to Pathology - Surgery  Once     Comments:  1) Sellar Region Tumor (frozen) 2) Sellar Region Tumor (perm)     Start Status   12/19/18 1234 Collected (12/19/18 1234)       12/19/18 1234

## 2018-12-20 PROBLEM — D72.829 LEUKOCYTOSIS: Status: ACTIVE | Noted: 2018-12-20

## 2018-12-20 PROBLEM — R50.9 FEVER: Status: ACTIVE | Noted: 2018-12-20

## 2018-12-20 LAB
ANION GAP SERPL CALC-SCNC: 9 MMOL/L
BASOPHILS # BLD AUTO: 0.02 K/UL
BASOPHILS NFR BLD: 0.1 %
BUN SERPL-MCNC: 13 MG/DL
CALCIUM SERPL-MCNC: 8.8 MG/DL
CHLORIDE SERPL-SCNC: 109 MMOL/L
CO2 SERPL-SCNC: 20 MMOL/L
CREAT SERPL-MCNC: 1.2 MG/DL
DIFFERENTIAL METHOD: ABNORMAL
EOSINOPHIL # BLD AUTO: 0 K/UL
EOSINOPHIL NFR BLD: 0 %
ERYTHROCYTE [DISTWIDTH] IN BLOOD BY AUTOMATED COUNT: 13.2 %
EST. GFR  (AFRICAN AMERICAN): >60 ML/MIN/1.73 M^2
EST. GFR  (NON AFRICAN AMERICAN): >60 ML/MIN/1.73 M^2
GLUCOSE SERPL-MCNC: 142 MG/DL
HCT VFR BLD AUTO: 41.9 %
HGB BLD-MCNC: 13.8 G/DL
IMM GRANULOCYTES # BLD AUTO: 0.07 K/UL
IMM GRANULOCYTES NFR BLD AUTO: 0.4 %
LYMPHOCYTES # BLD AUTO: 0.9 K/UL
LYMPHOCYTES NFR BLD: 5.5 %
MCH RBC QN AUTO: 28.1 PG
MCHC RBC AUTO-ENTMCNC: 32.9 G/DL
MCV RBC AUTO: 85 FL
MONOCYTES # BLD AUTO: 1 K/UL
MONOCYTES NFR BLD: 6 %
NEUTROPHILS # BLD AUTO: 14.5 K/UL
NEUTROPHILS NFR BLD: 88 %
NRBC BLD-RTO: 0 /100 WBC
PLATELET # BLD AUTO: 216 K/UL
PMV BLD AUTO: 10 FL
POCT GLUCOSE: 140 MG/DL (ref 70–110)
POCT GLUCOSE: 143 MG/DL (ref 70–110)
POCT GLUCOSE: 144 MG/DL (ref 70–110)
POCT GLUCOSE: 150 MG/DL (ref 70–110)
POCT GLUCOSE: 157 MG/DL (ref 70–110)
POTASSIUM SERPL-SCNC: 3.8 MMOL/L
RBC # BLD AUTO: 4.91 M/UL
SODIUM SERPL-SCNC: 138 MMOL/L
WBC # BLD AUTO: 16.53 K/UL

## 2018-12-20 PROCEDURE — 25000003 PHARM REV CODE 250: Performed by: STUDENT IN AN ORGANIZED HEALTH CARE EDUCATION/TRAINING PROGRAM

## 2018-12-20 PROCEDURE — 20600001 HC STEP DOWN PRIVATE ROOM

## 2018-12-20 PROCEDURE — 85025 COMPLETE CBC W/AUTO DIFF WBC: CPT

## 2018-12-20 PROCEDURE — 99232 SBSQ HOSP IP/OBS MODERATE 35: CPT | Mod: ,,, | Performed by: PSYCHIATRY & NEUROLOGY

## 2018-12-20 PROCEDURE — 63600175 PHARM REV CODE 636 W HCPCS: Performed by: STUDENT IN AN ORGANIZED HEALTH CARE EDUCATION/TRAINING PROGRAM

## 2018-12-20 PROCEDURE — 25000003 PHARM REV CODE 250: Performed by: NURSE PRACTITIONER

## 2018-12-20 PROCEDURE — 63600175 PHARM REV CODE 636 W HCPCS: Performed by: ANESTHESIOLOGY

## 2018-12-20 PROCEDURE — 94799 UNLISTED PULMONARY SVC/PX: CPT

## 2018-12-20 PROCEDURE — 25000003 PHARM REV CODE 250: Performed by: ANESTHESIOLOGY

## 2018-12-20 PROCEDURE — 94761 N-INVAS EAR/PLS OXIMETRY MLT: CPT

## 2018-12-20 PROCEDURE — 80048 BASIC METABOLIC PNL TOTAL CA: CPT

## 2018-12-20 PROCEDURE — 36415 COLL VENOUS BLD VENIPUNCTURE: CPT

## 2018-12-20 PROCEDURE — 99900035 HC TECH TIME PER 15 MIN (STAT)

## 2018-12-20 PROCEDURE — 99232 PR SUBSEQUENT HOSPITAL CARE,LEVL II: ICD-10-PCS | Mod: ,,, | Performed by: PSYCHIATRY & NEUROLOGY

## 2018-12-20 PROCEDURE — 25000003 PHARM REV CODE 250

## 2018-12-20 RX ORDER — OXYCODONE HYDROCHLORIDE 5 MG/1
TABLET ORAL
Status: COMPLETED
Start: 2018-12-20 | End: 2018-12-20

## 2018-12-20 RX ORDER — OXYCODONE HYDROCHLORIDE 5 MG/1
5 TABLET ORAL EVERY 4 HOURS PRN
Status: DISCONTINUED | OUTPATIENT
Start: 2018-12-20 | End: 2018-12-23 | Stop reason: HOSPADM

## 2018-12-20 RX ORDER — DEXAMETHASONE SODIUM PHOSPHATE 4 MG/ML
4 INJECTION, SOLUTION INTRA-ARTICULAR; INTRALESIONAL; INTRAMUSCULAR; INTRAVENOUS; SOFT TISSUE EVERY 6 HOURS
Status: DISCONTINUED | OUTPATIENT
Start: 2018-12-20 | End: 2018-12-22

## 2018-12-20 RX ORDER — HYDRALAZINE HYDROCHLORIDE 20 MG/ML
10 INJECTION INTRAMUSCULAR; INTRAVENOUS EVERY 6 HOURS PRN
Status: DISCONTINUED | OUTPATIENT
Start: 2018-12-20 | End: 2018-12-23 | Stop reason: HOSPADM

## 2018-12-20 RX ORDER — FAMOTIDINE 20 MG/1
20 TABLET, FILM COATED ORAL 2 TIMES DAILY
Status: DISCONTINUED | OUTPATIENT
Start: 2018-12-20 | End: 2018-12-23 | Stop reason: HOSPADM

## 2018-12-20 RX ORDER — LEVETIRACETAM 5 MG/ML
500 INJECTION INTRAVASCULAR EVERY 12 HOURS
Status: DISCONTINUED | OUTPATIENT
Start: 2018-12-20 | End: 2018-12-23 | Stop reason: HOSPADM

## 2018-12-20 RX ADMIN — DEXAMETHASONE SODIUM PHOSPHATE 4 MG: 4 INJECTION, SOLUTION INTRAMUSCULAR; INTRAVENOUS at 11:12

## 2018-12-20 RX ADMIN — OXYBUTYNIN CHLORIDE 5 MG: 5 TABLET ORAL at 08:12

## 2018-12-20 RX ADMIN — OXYCODONE HYDROCHLORIDE 5 MG: 5 TABLET ORAL at 01:12

## 2018-12-20 RX ADMIN — OXYCODONE HYDROCHLORIDE 5 MG: 5 TABLET ORAL at 08:12

## 2018-12-20 RX ADMIN — ACETAMINOPHEN 650 MG: 325 TABLET, FILM COATED ORAL at 12:12

## 2018-12-20 RX ADMIN — OXYCODONE HYDROCHLORIDE 5 MG: 5 TABLET ORAL at 04:12

## 2018-12-20 RX ADMIN — LEVETIRACETAM 500 MG: 5 INJECTION INTRAVENOUS at 09:12

## 2018-12-20 RX ADMIN — PROMETHAZINE HYDROCHLORIDE 6.25 MG: 25 INJECTION INTRAMUSCULAR; INTRAVENOUS at 12:12

## 2018-12-20 RX ADMIN — OXYCODONE HYDROCHLORIDE 5 MG: 5 TABLET ORAL at 10:12

## 2018-12-20 RX ADMIN — OXYCODONE HYDROCHLORIDE 5 MG: 5 TABLET ORAL at 06:12

## 2018-12-20 RX ADMIN — HEPARIN SODIUM 5000 UNITS: 5000 INJECTION, SOLUTION INTRAVENOUS; SUBCUTANEOUS at 01:12

## 2018-12-20 RX ADMIN — FAMOTIDINE 20 MG: 20 TABLET ORAL at 08:12

## 2018-12-20 RX ADMIN — DEXAMETHASONE SODIUM PHOSPHATE 4 MG: 4 INJECTION, SOLUTION INTRAMUSCULAR; INTRAVENOUS at 06:12

## 2018-12-20 RX ADMIN — MUPIROCIN 1 G: 20 OINTMENT TOPICAL at 08:12

## 2018-12-20 RX ADMIN — SODIUM CHLORIDE: 0.9 INJECTION, SOLUTION INTRAVENOUS at 05:12

## 2018-12-20 RX ADMIN — NICARDIPINE HYDROCHLORIDE 7.3 MG/HR: 0.2 INJECTION, SOLUTION INTRAVENOUS at 07:12

## 2018-12-20 RX ADMIN — CEFTRIAXONE 2 G: 2 INJECTION, SOLUTION INTRAVENOUS at 08:12

## 2018-12-20 RX ADMIN — NICARDIPINE HYDROCHLORIDE 9.6 MG/HR: 0.2 INJECTION, SOLUTION INTRAVENOUS at 12:12

## 2018-12-20 RX ADMIN — PROMETHAZINE HYDROCHLORIDE 6.25 MG: 25 INJECTION INTRAMUSCULAR; INTRAVENOUS at 05:12

## 2018-12-20 RX ADMIN — FAMOTIDINE 20 MG: 20 TABLET ORAL at 09:12

## 2018-12-20 RX ADMIN — HYDROCODONE BITARTRATE AND ACETAMINOPHEN 1 TABLET: 5; 325 TABLET ORAL at 01:12

## 2018-12-20 RX ADMIN — HEPARIN SODIUM 5000 UNITS: 5000 INJECTION, SOLUTION INTRAVENOUS; SUBCUTANEOUS at 05:12

## 2018-12-20 RX ADMIN — HEPARIN SODIUM 5000 UNITS: 5000 INJECTION, SOLUTION INTRAVENOUS; SUBCUTANEOUS at 09:12

## 2018-12-20 NOTE — ASSESSMENT & PLAN NOTE
38 M with h/o known tuberculum sella meningioma s/p elective resection.    POD#1 R pterional crani for resection  Continue [facility] admit  q1h neurochecks  Post op CT reviewed  Dex 4q6h w/GI ppx while on steriod  SBP <140  Continue HV drain, abx prophylaxis while HV in place  Ok for diet    Dispo: Continue ICU care

## 2018-12-20 NOTE — PROGRESS NOTES
Oral care provided this morning and face washed. Pt AAOx4. PO morning meds administered as ordered. Swallowing without difficulty. Pt refused breakfast this morning. Nausea resolved. VSS; BP parameters maintained with use of Cardene gtt. Neuro checks intact. RN remains at bedside. Safety maintained. Will continue to monitor and reassess.

## 2018-12-20 NOTE — PLAN OF CARE
Patient in NAD. Patient complains of mild to moderate pain to head relieved with PRN pain medications. Right head incision dressing c/d/i. Hemovac drain in place to full suction to right head. Maintaining SBPs<140 with cardene gtt. Fall precautions maintained. Bed in locked low position side rails up x2. Patient condition stable. Will continue to monitor patient.

## 2018-12-20 NOTE — SUBJECTIVE & OBJECTIVE
Past Medical History:   Diagnosis Date    Arthritis     Chronic back pain     Chronic back pain     Meningioma      Past Surgical History:   Procedure Laterality Date    HAND SURGERY Right 2008    SPINE SURGERY  2001      No current facility-administered medications on file prior to encounter.      No current outpatient medications on file prior to encounter.      Allergies: Patient has no known allergies.    History reviewed. No pertinent family history.  Social History     Tobacco Use    Smoking status: Never Smoker    Smokeless tobacco: Never Used   Substance Use Topics    Alcohol use: No     Frequency: Never    Drug use: No     Review of Systems   Constitutional: Negative for fever.   HENT: Negative for trouble swallowing.    Eyes: Negative for visual disturbance.   Respiratory: Negative for shortness of breath.    Cardiovascular: Negative for chest pain.   Gastrointestinal: Negative for nausea.   Genitourinary: Negative for difficulty urinating.   Musculoskeletal: Positive for back pain. Negative for neck pain and neck stiffness.   Neurological: Positive for headaches. Negative for weakness and numbness.     Objective:     Vitals:    Temp: 99.3 °F (37.4 °C)  Pulse: 107  Rhythm: sinus tachycardia  BP: 135/66  MAP (mmHg): 91  Resp: 20  SpO2: (!) 94 %  O2 Device (Oxygen Therapy): room air    Temp  Min: 98.5 °F (36.9 °C)  Max: 101.3 °F (38.5 °C)  Pulse  Min: 95  Max: 120  BP  Min: 106/50  Max: 158/81  MAP (mmHg)  Min: 72  Max: 107  Resp  Min: 15  Max: 30  SpO2  Min: 92 %  Max: 100 %    12/19 0701 - 12/20 0700  In: 4050.5 [I.V.:3900.5]  Out: 4780 [Urine:4540; Drains:80]   Unmeasured Output  Emesis Occurrence: 1       Physical Exam   Constitutional: He appears well-developed and well-nourished.   HENT:   SG drain in place   Eyes: EOM are normal. Pupils are equal, round, and reactive to light.   Neck: Normal range of motion. Neck supple.   Cardiovascular: Normal rate.   Pulmonary/Chest: Effort normal.    Abdominal: Soft. He exhibits no distension.   Neurological: He is alert. He has normal strength. No cranial nerve deficit or sensory deficit. GCS eye subscore is 4. GCS verbal subscore is 5. GCS motor subscore is 6.

## 2018-12-20 NOTE — PROGRESS NOTES
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     Neurologically stable.        Post-Op Info:  Procedure(s) (LRB):  CRANIOTOMY, USING FRAMELESS STEREOTAXY, Meningioma (Right)   1 Day Post-Op     Interval History: NAEON. C/o nausea around midnight, one episode of emesis, resolving.     Medications:  Continuous Infusions:   sodium chloride 0.9% 100 mL/hr at 12/19/18 1412    nicardipine 9.6 mg/hr (12/20/18 0915)     Scheduled Meds:   cefTRIAXone (ROCEPHIN) IVPB  2 g Intravenous Q12H    dexamethasone  4 mg Intravenous Q6H    famotidine  20 mg Oral BID    heparin (porcine)  5,000 Units Subcutaneous Q8H    levetiracetam IVPB  500 mg Intravenous Q12H    mupirocin  1 g Nasal BID     PRN Meds:acetaminophen, acetaminophen, dextrose 50%, dextrose 50%, glucagon (human recombinant), glucose, glucose, ondansetron, oxyCODONE     Review of Systems  Objective:     Weight: 134.3 kg (296 lb)  Body mass index is 39.05 kg/m².  Vital Signs (Most Recent):  Temp: 98.5 °F (36.9 °C) (12/20/18 0900)  Pulse: 104 (12/20/18 0900)  Resp: 20 (12/20/18 0900)  BP: 139/69 (12/20/18 0900)  SpO2: (!) 94 % (12/20/18 0900) Vital Signs (24h Range):  Temp:  [98.5 °F (36.9 °C)-101.3 °F (38.5 °C)] 98.5 °F (36.9 °C)  Pulse:  [] 104  Resp:  [15-30] 20  SpO2:  [92 %-100 %] 94 %  BP: (106-158)/(50-85) 139/69  Arterial Line BP: ()/(62-83) 140/68     Date 12/20/18 0700 - 12/21/18 0659   Shift 4572-0307 4777-0804 0988-5489 24 Hour Total   INTAKE   P.O. 0   0   I.V.(mL/kg) 2473.5(18.4)   2473.5(18.4)   Shift Total(mL/kg) 2473.5(18.4)   2473.5(18.4)   OUTPUT   Urine(mL/kg/hr) 325   325   Drains 30   30   Shift Total(mL/kg) 355(2.6)   355(2.6)   Weight (kg) 134.3 134.3 134.3 134.3                        Closed/Suction Drain 12/19/18 1312 Right  Accordion 10 Fr. (Active)   Site Description  "Unable to view 12/20/2018  8:00 AM   Dressing Type [place] 12/20/2018  8:00 AM   Dressing Status Clean;Dry;Intact 12/20/2018  8:00 AM   Drainage Serosanguineous 12/20/2018  8:00 AM   Status Other (Comment) 12/20/2018  8:00 AM   Output (mL) 30 mL 12/20/2018  8:00 AM            Urethral Catheter 12/19/18 0825 Straight-tip;Non-latex 16 Fr. (Active)   Site Assessment Clean;Intact 12/20/2018  8:00 AM   Collection Container Urimeter 12/20/2018  8:00 AM   Securement Method secured to top of thigh w/ adhesive device 12/20/2018  8:00 AM   Catheter Care Performed yes 12/20/2018  8:00 AM   Reason for Continuing Urinary Catheterization Post operative 12/20/2018  8:00 AM   CAUTI Prevention Bundle StatLock in place w 1" slack;Intact seal between catheter & drainage tubing;Drainage bag off the floor;Green sheeting clip in use;No dependent loops or kinks;Drainage bag not overfilled (<2/3 full);Drainage bag below bladder 12/20/2018  7:00 AM   Output (mL) 100 mL 12/20/2018  9:00 AM       Neurosurgery Physical Exam   Awake, alert, no acute distress  AOx3  R crani dressing c/d/i with HV  PERRL  EOMI  VF grossly intact  CN II-XII grossly intact  FCx4  No focal deficits    Significant Labs:  Recent Labs   Lab 12/19/18  1353 12/20/18  0551   * 142*    138   K 4.8 3.8   * 109   CO2 19* 20*   BUN 12 13   CREATININE 1.4 1.2   CALCIUM 8.6* 8.8     Recent Labs   Lab 12/19/18  0957 12/19/18  1353 12/20/18  0551   WBC  --  8.84 16.53*   HGB  --  14.0 13.8*   HCT 39 42.1 41.9   PLT  --  221 216     Recent Labs   Lab 12/19/18  0619   INR 1.0   APTT 23.3     Microbiology Results (last 7 days)     ** No results found for the last 168 hours. **        Recent Lab Results       12/20/18  0551   12/20/18  0005   12/19/18  1353        Immature Granulocytes 0.4   0.5     Immature Grans (Abs) 0.07  Comment:  Mild elevation in immature granulocytes is non specific and   can be seen in a variety of conditions including stress " response,   acute inflammation, trauma and pregnancy. Correlation with other   laboratory and clinical findings is essential.     0.04  Comment:  Mild elevation in immature granulocytes is non specific and   can be seen in a variety of conditions including stress response,   acute inflammation, trauma and pregnancy. Correlation with other   laboratory and clinical findings is essential.       Anion Gap 9   8     Baso # 0.02   0.01     Basophil% 0.1   0.1     BUN, Bld 13   12     Calcium 8.8   8.6     Chloride 109   111     CO2 20   19     Creatinine 1.2   1.4     Differential Method Automated   Automated     eGFR if  >60.0   >60.0     eGFR if non  >60.0  Comment:  Calculation used to obtain the estimated glomerular filtration  rate (eGFR) is the CKD-EPI equation.      >60.0  Comment:  Calculation used to obtain the estimated glomerular filtration  rate (eGFR) is the CKD-EPI equation.        Eos # 0.0   0.0     Eosinophil% 0.0   0.1     Glucose 142   170     Gran # (ANC) 14.5   7.5     Gran% 88.0   85.2     Hematocrit 41.9   42.1     Hemoglobin 13.8   14.0     Lymph # 0.9   1.1     Lymph% 5.5   12.2     MCH 28.1   27.9     MCHC 32.9   33.3     MCV 85   84     Mono # 1.0   0.2     Mono% 6.0   1.9     MPV 10.0   10.2     nRBC 0   0     Platelets 216   221     POCT Glucose   157       Potassium 3.8   4.8     RBC 4.91   5.01     RDW 13.2   13.0     Sodium 138   138     WBC 16.53   8.84           Significant Diagnostics:  I have reviewed all pertinent imaging results/findings within the past 24 hours.    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    No acute events overnight. Neurologically intact on exam. Visual exam stable.    --Continue care per primary team.  --Continue levetiracetam 500 bid.  --Continue dexamethasone 4q6.  --Continue chemical PUD prophylaxis while undergoing systemic  glucocorticoid regimen.  --Continue subgaleal hemovac drain to full suction.  --Continue prophylactic antibiotics while hemovac drain in place.  --Pt likely stable for stepdown tomorrow.  --We will continue to monitor closely, please contact us with any questions or concerns.           Caryn Mcclain MD  Neurosurgery  [facility]

## 2018-12-20 NOTE — HPI
39 yo male with known tuberculum sella meningioma presenting for elective craniotomy for resection.     Neurologically stable.

## 2018-12-20 NOTE — SUBJECTIVE & OBJECTIVE
Interval History: NAEON. C/o nausea around midnight, one episode of emesis, resolving.     Medications:  Continuous Infusions:   sodium chloride 0.9% 100 mL/hr at 12/19/18 1412    nicardipine 9.6 mg/hr (12/20/18 0915)     Scheduled Meds:   cefTRIAXone (ROCEPHIN) IVPB  2 g Intravenous Q12H    dexamethasone  4 mg Intravenous Q6H    famotidine  20 mg Oral BID    heparin (porcine)  5,000 Units Subcutaneous Q8H    levetiracetam IVPB  500 mg Intravenous Q12H    mupirocin  1 g Nasal BID     PRN Meds:acetaminophen, acetaminophen, dextrose 50%, dextrose 50%, glucagon (human recombinant), glucose, glucose, ondansetron, oxyCODONE     Review of Systems  Objective:     Weight: 134.3 kg (296 lb)  Body mass index is 39.05 kg/m².  Vital Signs (Most Recent):  Temp: 98.5 °F (36.9 °C) (12/20/18 0900)  Pulse: 104 (12/20/18 0900)  Resp: 20 (12/20/18 0900)  BP: 139/69 (12/20/18 0900)  SpO2: (!) 94 % (12/20/18 0900) Vital Signs (24h Range):  Temp:  [98.5 °F (36.9 °C)-101.3 °F (38.5 °C)] 98.5 °F (36.9 °C)  Pulse:  [] 104  Resp:  [15-30] 20  SpO2:  [92 %-100 %] 94 %  BP: (106-158)/(50-85) 139/69  Arterial Line BP: ()/(62-83) 140/68     Date 12/20/18 0700 - 12/21/18 0659   Shift 8482-7323 4570-4448 6041-6835 24 Hour Total   INTAKE   P.O. 0   0   I.V.(mL/kg) 2473.5(18.4)   2473.5(18.4)   Shift Total(mL/kg) 2473.5(18.4)   2473.5(18.4)   OUTPUT   Urine(mL/kg/hr) 325   325   Drains 30   30   Shift Total(mL/kg) 355(2.6)   355(2.6)   Weight (kg) 134.3 134.3 134.3 134.3                        Closed/Suction Drain 12/19/18 1312 Right  Accordion 10 Fr. (Active)   Site Description Unable to view 12/20/2018  8:00 AM   Dressing Type [place] 12/20/2018  8:00 AM   Dressing Status Clean;Dry;Intact 12/20/2018  8:00 AM   Drainage Serosanguineous 12/20/2018  8:00 AM   Status Other (Comment) 12/20/2018  8:00 AM   Output (mL) 30 mL 12/20/2018  8:00 AM            Urethral Catheter 12/19/18 0825 Straight-tip;Non-latex 16 Fr. (Active)  "  Site Assessment Clean;Intact 12/20/2018  8:00 AM   Collection Container Urimeter 12/20/2018  8:00 AM   Securement Method secured to top of thigh w/ adhesive device 12/20/2018  8:00 AM   Catheter Care Performed yes 12/20/2018  8:00 AM   Reason for Continuing Urinary Catheterization Post operative 12/20/2018  8:00 AM   CAUTI Prevention Bundle StatLock in place w 1" slack;Intact seal between catheter & drainage tubing;Drainage bag off the floor;Green sheeting clip in use;No dependent loops or kinks;Drainage bag not overfilled (<2/3 full);Drainage bag below bladder 12/20/2018  7:00 AM   Output (mL) 100 mL 12/20/2018  9:00 AM       Neurosurgery Physical Exam   Awake, alert, no acute distress  AOx3  R crani dressing c/d/i with HV  PERRL  EOMI  VF grossly intact  CN II-XII grossly intact  FCx4  No focal deficits    Significant Labs:  Recent Labs   Lab 12/19/18  1353 12/20/18  0551   * 142*    138   K 4.8 3.8   * 109   CO2 19* 20*   BUN 12 13   CREATININE 1.4 1.2   CALCIUM 8.6* 8.8     Recent Labs   Lab 12/19/18  0957 12/19/18  1353 12/20/18  0551   WBC  --  8.84 16.53*   HGB  --  14.0 13.8*   HCT 39 42.1 41.9   PLT  --  221 216     Recent Labs   Lab 12/19/18  0619   INR 1.0   APTT 23.3     Microbiology Results (last 7 days)     ** No results found for the last 168 hours. **        Recent Lab Results       12/20/18  0551   12/20/18  0005   12/19/18  1353        Immature Granulocytes 0.4   0.5     Immature Grans (Abs) 0.07  Comment:  Mild elevation in immature granulocytes is non specific and   can be seen in a variety of conditions including stress response,   acute inflammation, trauma and pregnancy. Correlation with other   laboratory and clinical findings is essential.     0.04  Comment:  Mild elevation in immature granulocytes is non specific and   can be seen in a variety of conditions including stress response,   acute inflammation, trauma and pregnancy. Correlation with other   laboratory and " clinical findings is essential.       Anion Gap 9   8     Baso # 0.02   0.01     Basophil% 0.1   0.1     BUN, Bld 13   12     Calcium 8.8   8.6     Chloride 109   111     CO2 20   19     Creatinine 1.2   1.4     Differential Method Automated   Automated     eGFR if  >60.0   >60.0     eGFR if non  >60.0  Comment:  Calculation used to obtain the estimated glomerular filtration  rate (eGFR) is the CKD-EPI equation.      >60.0  Comment:  Calculation used to obtain the estimated glomerular filtration  rate (eGFR) is the CKD-EPI equation.        Eos # 0.0   0.0     Eosinophil% 0.0   0.1     Glucose 142   170     Gran # (ANC) 14.5   7.5     Gran% 88.0   85.2     Hematocrit 41.9   42.1     Hemoglobin 13.8   14.0     Lymph # 0.9   1.1     Lymph% 5.5   12.2     MCH 28.1   27.9     MCHC 32.9   33.3     MCV 85   84     Mono # 1.0   0.2     Mono% 6.0   1.9     MPV 10.0   10.2     nRBC 0   0     Platelets 216   221     POCT Glucose   157       Potassium 3.8   4.8     RBC 4.91   5.01     RDW 13.2   13.0     Sodium 138   138     WBC 16.53   8.84           Significant Diagnostics:  I have reviewed all pertinent imaging results/findings within the past 24 hours.

## 2018-12-20 NOTE — ASSESSMENT & PLAN NOTE
tuberculum sellae meningioma  -s/p resection  -SG drain in place  -HOB>30  --140  -pain management  -PT/OT/ST  -continue to [facility] for neurochecks  -rodo, wean cardene  -abx while drain in place

## 2018-12-20 NOTE — PROGRESS NOTES
Dr. Tucker at bedside with patient. Per MD patient ok for stepdown unit. Call placed to HARJIT Carlson to notify of Dr. Tuckers new orders and to update transfer order. Notified Dr. Tucker of persistent low grade temps and patent on Cardene drip. Per MD neurosurgery team can place order for PO BP control med.

## 2018-12-20 NOTE — PROGRESS NOTES
[facility]  Neurocritical Care  Progress Note    Admit Date: 12/19/2018  Service Date: 12/20/2018  Length of Stay: 1    Subjective:     Chief Complaint: Mass    History of Present Illness: 38 year old male presenting s/p meningioma resection. Patient presented with persistent headaches and erectile dysfunction and was found to have tuberculum sellae meningioma 1.3 cm tuberculum sellae meningioma. Surgery was reportedly uneventful. Admitted to [facility] with SG drain in place.    Hospital Course: No notes on file    Past Medical History:   Diagnosis Date    Arthritis     Chronic back pain     Chronic back pain     Meningioma      Past Surgical History:   Procedure Laterality Date    HAND SURGERY Right 2008    SPINE SURGERY  2001      No current facility-administered medications on file prior to encounter.      No current outpatient medications on file prior to encounter.      Allergies: Patient has no known allergies.    History reviewed. No pertinent family history.  Social History     Tobacco Use    Smoking status: Never Smoker    Smokeless tobacco: Never Used   Substance Use Topics    Alcohol use: No     Frequency: Never    Drug use: No     Review of Systems   Constitutional: Negative for fever.   HENT: Negative for trouble swallowing.    Eyes: Negative for visual disturbance.   Respiratory: Negative for shortness of breath.    Cardiovascular: Negative for chest pain.   Gastrointestinal: Negative for nausea.   Genitourinary: Negative for difficulty urinating.   Musculoskeletal: Positive for back pain. Negative for neck pain and neck stiffness.   Neurological: Positive for headaches. Negative for weakness and numbness.     Objective:     Vitals:    Temp: 99.3 °F (37.4 °C)  Pulse: 107  Rhythm: sinus tachycardia  BP: 135/66  MAP (mmHg): 91  Resp: 20  SpO2: (!) 94 %  O2 Device (Oxygen Therapy): room air    Temp  Min: 98.5 °F (36.9 °C)  Max: 101.3 °F (38.5 °C)  Pulse  Min: 95  Max: 120  BP  Min:  106/50  Max: 158/81  MAP (mmHg)  Min: 72  Max: 107  Resp  Min: 15  Max: 30  SpO2  Min: 92 %  Max: 100 %    12/19 0701 - 12/20 0700  In: 4050.5 [I.V.:3900.5]  Out: 4780 [Urine:4540; Drains:80]   Unmeasured Output  Emesis Occurrence: 1       Physical Exam   Constitutional: He appears well-developed and well-nourished.   HENT:   SG drain in place   Eyes: EOM are normal. Pupils are equal, round, and reactive to light.   Neck: Normal range of motion. Neck supple.   Cardiovascular: Normal rate.   Pulmonary/Chest: Effort normal.   Abdominal: Soft. He exhibits no distension.   Neurological: He is alert. He has normal strength. No cranial nerve deficit or sensory deficit. GCS eye subscore is 4. GCS verbal subscore is 5. GCS motor subscore is 6.         Assessment/Plan:     Cardiac/Vascular   Essential hypertension    Sarah  Wean cardene     Oncology   Leukocytosis    Pull maza  Monitor for improvement     Meningioma    tuberculum sellae meningioma  -s/p resection  -SG drain in place  -HOB>30  --140  -pain management  -PT/OT/ST  -continue to [facility] for neurochecks  -sarah, wean cardene  -abx while drain in place     Other   Fever    [place] maza  Monitor for improvement           The patient is being Prophylaxed for:  Venous Thromboembolism with: Mechanical or Chemical  Stress Ulcer with: H2B  Ventilator Pneumonia with: not applicable    Activity Orders          None        No Order    Deon Barboza MD  Neurocritical Care  [facility]

## 2018-12-21 LAB — POCT GLUCOSE: 115 MG/DL (ref 70–110)

## 2018-12-21 PROCEDURE — 63600175 PHARM REV CODE 636 W HCPCS: Performed by: STUDENT IN AN ORGANIZED HEALTH CARE EDUCATION/TRAINING PROGRAM

## 2018-12-21 PROCEDURE — 20600001 HC STEP DOWN PRIVATE ROOM

## 2018-12-21 PROCEDURE — 25000003 PHARM REV CODE 250: Performed by: NURSE PRACTITIONER

## 2018-12-21 PROCEDURE — 25000003 PHARM REV CODE 250: Performed by: STUDENT IN AN ORGANIZED HEALTH CARE EDUCATION/TRAINING PROGRAM

## 2018-12-21 RX ORDER — DIPHENHYDRAMINE HYDROCHLORIDE 50 MG/ML
INJECTION INTRAMUSCULAR; INTRAVENOUS
Status: DISPENSED
Start: 2018-12-21 | End: 2018-12-21

## 2018-12-21 RX ADMIN — MUPIROCIN 1 G: 20 OINTMENT TOPICAL at 09:12

## 2018-12-21 RX ADMIN — LEVETIRACETAM 500 MG: 5 INJECTION INTRAVENOUS at 08:12

## 2018-12-21 RX ADMIN — OXYCODONE HYDROCHLORIDE 5 MG: 5 TABLET ORAL at 09:12

## 2018-12-21 RX ADMIN — DEXAMETHASONE SODIUM PHOSPHATE 4 MG: 4 INJECTION, SOLUTION INTRAMUSCULAR; INTRAVENOUS at 06:12

## 2018-12-21 RX ADMIN — LEVETIRACETAM 500 MG: 5 INJECTION INTRAVENOUS at 09:12

## 2018-12-21 RX ADMIN — CEFTRIAXONE 2 G: 2 INJECTION, SOLUTION INTRAVENOUS at 02:12

## 2018-12-21 RX ADMIN — SODIUM CHLORIDE: 0.9 INJECTION, SOLUTION INTRAVENOUS at 04:12

## 2018-12-21 RX ADMIN — HEPARIN SODIUM 5000 UNITS: 5000 INJECTION, SOLUTION INTRAVENOUS; SUBCUTANEOUS at 10:12

## 2018-12-21 RX ADMIN — DEXAMETHASONE SODIUM PHOSPHATE 4 MG: 4 INJECTION, SOLUTION INTRAMUSCULAR; INTRAVENOUS at 12:12

## 2018-12-21 RX ADMIN — SODIUM CHLORIDE: 0.9 INJECTION, SOLUTION INTRAVENOUS at 01:12

## 2018-12-21 RX ADMIN — HEPARIN SODIUM 5000 UNITS: 5000 INJECTION, SOLUTION INTRAVENOUS; SUBCUTANEOUS at 02:12

## 2018-12-21 RX ADMIN — FAMOTIDINE 20 MG: 20 TABLET ORAL at 09:12

## 2018-12-21 RX ADMIN — HEPARIN SODIUM 5000 UNITS: 5000 INJECTION, SOLUTION INTRAVENOUS; SUBCUTANEOUS at 06:12

## 2018-12-21 RX ADMIN — OXYCODONE HYDROCHLORIDE 5 MG: 5 TABLET ORAL at 08:12

## 2018-12-21 RX ADMIN — ACETAMINOPHEN 650 MG: 325 TABLET, FILM COATED ORAL at 04:12

## 2018-12-21 RX ADMIN — FAMOTIDINE 20 MG: 20 TABLET ORAL at 08:12

## 2018-12-21 RX ADMIN — CEFTRIAXONE 2 G: 2 INJECTION, SOLUTION INTRAVENOUS at 12:12

## 2018-12-21 NOTE — PLAN OF CARE
Problem: Adult Inpatient Plan of Care  Goal: Plan of Care Review  Outcome: Ongoing (interventions implemented as appropriate)  Patient drowsy but easily arousable, medicated with prn oxy, tylenol for mild to moderate HA. Voiding clear lazara urine in urinal, IVf maintained at 100cc/hr, po intake, in bed mobility and IS encouraged. 2L NC O2 replaced HS for irregular breathing pattern - RA 91-94% - Wife at bedside, supportive with care.

## 2018-12-21 NOTE — ANESTHESIA POSTPROCEDURE EVALUATION
"Anesthesia Post Evaluation    Patient: Salvatore Figueroa    Procedure(s) Performed: Procedure(s) (LRB):  CRANIOTOMY, USING FRAMELESS STEREOTAXY, Meningioma (Right)    Final Anesthesia Type: general  Patient location during evaluation: floor  Patient participation: Yes- Able to Participate  Level of consciousness: awake and alert  Post-procedure vital signs: reviewed and stable  Pain management: adequate  Airway patency: patent  PONV status at discharge: No PONV  Anesthetic complications: no      Cardiovascular status: blood pressure returned to baseline  Respiratory status: unassisted, spontaneous ventilation and room air  Hydration status: euvolemic  Follow-up not needed.        Visit Vitals  /68   Pulse 78   Temp 36.9 °C (98.5 °F)   Resp 18   Ht 6' 1" (1.854 m)   Wt 135 kg (297 lb 9.9 oz)   SpO2 98%   BMI 39.27 kg/m²       Pain/Anabella Score: Pain Rating Prior to Med Admin: 7 (12/21/2018  9:58 AM)  Pain Rating Post Med Admin: 6 (12/21/2018  7:24 AM)  Anabella Score: 10 (12/20/2018  3:00 AM)        "

## 2018-12-21 NOTE — PLAN OF CARE
Pt AAOx4. Denies current pain to right ear. Dressing remains CDI. Tolerating sips of water. VSS. Safety maintained. Pt to be discharged home per [facility].

## 2018-12-21 NOTE — NURSING TRANSFER
Nursing Transfer Note      12/20/2018     Transfer to: [address]    Transfer via: Bed    Transfer with: IV Pump    Transported by: PCT x2    Medicines sent: N/A    Chart send with patient: Yes    Notified: spouse; Report called to SRINIVASAN Angela    Patient reassessed at: [id number]

## 2018-12-22 LAB
POCT GLUCOSE: 114 MG/DL (ref 70–110)
POCT GLUCOSE: 118 MG/DL (ref 70–110)
POCT GLUCOSE: 131 MG/DL (ref 70–110)

## 2018-12-22 PROCEDURE — 25000003 PHARM REV CODE 250: Performed by: NURSE PRACTITIONER

## 2018-12-22 PROCEDURE — 20600001 HC STEP DOWN PRIVATE ROOM

## 2018-12-22 PROCEDURE — 25000003 PHARM REV CODE 250: Performed by: STUDENT IN AN ORGANIZED HEALTH CARE EDUCATION/TRAINING PROGRAM

## 2018-12-22 PROCEDURE — 63600175 PHARM REV CODE 636 W HCPCS: Performed by: STUDENT IN AN ORGANIZED HEALTH CARE EDUCATION/TRAINING PROGRAM

## 2018-12-22 RX ORDER — DEXAMETHASONE 1 MG/1
3 TABLET ORAL EVERY 8 HOURS
Status: DISCONTINUED | OUTPATIENT
Start: 2018-12-24 | End: 2018-12-23 | Stop reason: HOSPADM

## 2018-12-22 RX ORDER — DEXAMETHASONE 1 MG/1
2 TABLET ORAL DAILY
Status: DISCONTINUED | OUTPATIENT
Start: 2018-12-31 | End: 2018-12-23 | Stop reason: HOSPADM

## 2018-12-22 RX ORDER — DEXAMETHASONE 4 MG/1
4 TABLET ORAL EVERY 8 HOURS
Status: DISCONTINUED | OUTPATIENT
Start: 2018-12-22 | End: 2018-12-23 | Stop reason: HOSPADM

## 2018-12-22 RX ORDER — DEXAMETHASONE 1 MG/1
3 TABLET ORAL EVERY 12 HOURS
Status: DISCONTINUED | OUTPATIENT
Start: 2018-12-26 | End: 2018-12-23 | Stop reason: HOSPADM

## 2018-12-22 RX ORDER — DEXAMETHASONE 1 MG/1
2 TABLET ORAL EVERY 12 HOURS
Status: DISCONTINUED | OUTPATIENT
Start: 2018-12-28 | End: 2018-12-23 | Stop reason: HOSPADM

## 2018-12-22 RX ADMIN — DEXAMETHASONE SODIUM PHOSPHATE 4 MG: 4 INJECTION, SOLUTION INTRAMUSCULAR; INTRAVENOUS at 12:12

## 2018-12-22 RX ADMIN — CEFTRIAXONE 2 G: 2 INJECTION, SOLUTION INTRAVENOUS at 12:12

## 2018-12-22 RX ADMIN — DEXAMETHASONE SODIUM PHOSPHATE 4 MG: 4 INJECTION, SOLUTION INTRAMUSCULAR; INTRAVENOUS at 01:12

## 2018-12-22 RX ADMIN — OXYCODONE HYDROCHLORIDE 5 MG: 5 TABLET ORAL at 10:12

## 2018-12-22 RX ADMIN — LEVETIRACETAM 500 MG: 5 INJECTION INTRAVENOUS at 10:12

## 2018-12-22 RX ADMIN — FAMOTIDINE 20 MG: 20 TABLET ORAL at 08:12

## 2018-12-22 RX ADMIN — FAMOTIDINE 20 MG: 20 TABLET ORAL at 10:12

## 2018-12-22 RX ADMIN — MUPIROCIN 1 G: 20 OINTMENT TOPICAL at 10:12

## 2018-12-22 RX ADMIN — DEXAMETHASONE 4 MG: 4 TABLET ORAL at 08:12

## 2018-12-22 RX ADMIN — HEPARIN SODIUM 5000 UNITS: 5000 INJECTION, SOLUTION INTRAVENOUS; SUBCUTANEOUS at 08:12

## 2018-12-22 RX ADMIN — MUPIROCIN 1 G: 20 OINTMENT TOPICAL at 12:12

## 2018-12-22 RX ADMIN — HEPARIN SODIUM 5000 UNITS: 5000 INJECTION, SOLUTION INTRAVENOUS; SUBCUTANEOUS at 01:12

## 2018-12-22 RX ADMIN — DEXAMETHASONE SODIUM PHOSPHATE 4 MG: 4 INJECTION, SOLUTION INTRAMUSCULAR; INTRAVENOUS at 05:12

## 2018-12-22 RX ADMIN — CEFTRIAXONE 2 G: 2 INJECTION, SOLUTION INTRAVENOUS at 11:12

## 2018-12-22 RX ADMIN — MUPIROCIN 1 G: 20 OINTMENT TOPICAL at 08:12

## 2018-12-22 RX ADMIN — LEVETIRACETAM 500 MG: 5 INJECTION INTRAVENOUS at 08:12

## 2018-12-22 RX ADMIN — HEPARIN SODIUM 5000 UNITS: 5000 INJECTION, SOLUTION INTRAVENOUS; SUBCUTANEOUS at 05:12

## 2018-12-22 RX ADMIN — CEFTRIAXONE 2 G: 2 INJECTION, SOLUTION INTRAVENOUS at 01:12

## 2018-12-22 NOTE — SUBJECTIVE & OBJECTIVE
Medications:  Continuous Infusions:   sodium chloride 0.9% 100 mL/hr at 12/21/18 1358     Scheduled Meds:   cefTRIAXone (ROCEPHIN) IVPB  2 g Intravenous Q12H    dexamethasone  4 mg Intravenous Q6H    famotidine  20 mg Oral BID    heparin (porcine)  5,000 Units Subcutaneous Q8H    levetiracetam IVPB  500 mg Intravenous Q12H    mupirocin  1 g Nasal BID     PRN Meds:acetaminophen, acetaminophen, dextrose 50%, dextrose 50%, glucagon (human recombinant), glucose, glucose, hydrALAZINE, ondansetron, oxyCODONE     Review of Systems    Objective:     Weight: 135 kg (297 lb 9.9 oz)  Body mass index is 39.27 kg/m².  Vital Signs (Most Recent):  Temp: 98.2 °F (36.8 °C) (12/22/18 1600)  Pulse: 63 (12/22/18 1600)  Resp: 18 (12/22/18 1600)  BP: (!) 142/77 (12/22/18 1600)  SpO2: (!) 94 % (12/22/18 1600) Vital Signs (24h Range):  Temp:  [97 °F (36.1 °C)-98.5 °F (36.9 °C)] 98.2 °F (36.8 °C)  Pulse:  [58-77] 63  Resp:  [16-19] 18  SpO2:  [94 %-99 %] 94 %  BP: (120-142)/(73-81) 142/77     Date 12/22/18 0700 - 12/23/18 0659   Shift 9984-7894 3528-1508 2995-7597 24 Hour Total   INTAKE   Shift Total(mL/kg)       OUTPUT   Drains 5   5   Shift Total(mL/kg) 5(0)   5(0)   Weight (kg) 135 135 135 135                        Closed/Suction Drain 12/19/18 1312 Right  Accordion 10 Fr. (Active)   Site Description Unable to view 12/20/2018  8:00 AM   Dressing Type [place] 12/20/2018  8:00 AM   Dressing Status Clean;Dry;Intact 12/20/2018  8:00 AM   Drainage Serosanguineous 12/20/2018  8:00 AM   Status Other (Comment) 12/20/2018  8:00 AM   Output (mL) 30 mL 12/20/2018  8:00 AM            Urethral Catheter 12/19/18 0825 Straight-tip;Non-latex 16 Fr. (Active)   Site Assessment Clean;Intact 12/20/2018  8:00 AM   Collection Container Urimeter 12/20/2018  8:00 AM   Securement Method secured to top of thigh w/ adhesive device 12/20/2018  8:00 AM   Catheter Care Performed yes 12/20/2018  8:00 AM   Reason for Continuing Urinary Catheterization  "Post operative 12/20/2018  8:00 AM   CAUTI Prevention Bundle StatLock in place w 1" slack;Intact seal between catheter & drainage tubing;Drainage bag off the floor;Green sheeting clip in use;No dependent loops or kinks;Drainage bag not overfilled (<2/3 full);Drainage bag below bladder 12/20/2018  7:00 AM   Output (mL) 100 mL 12/20/2018  9:00 AM       Neurosurgery Physical Exam     Awake, alert, no acute distress  AOx3  R crani dressing c/d/i with HV  PERRL  EOMI  VF grossly intact  CN II-XII grossly intact  FCx4  No focal deficits    Significant Labs:  No results for input(s): GLU, NA, K, CL, CO2, BUN, CREATININE, CALCIUM, MG in the last 48 hours.  No results for input(s): WBC, HGB, HCT, PLT in the last 48 hours.  No results for input(s): LABPT, INR, APTT in the last 48 hours.  Microbiology Results (last 7 days)     ** No results found for the last 168 hours. **        Recent Lab Results       12/22/18  0524   12/21/18 2049        POCT Glucose 118 115           Significant Diagnostics:  I have reviewed all pertinent imaging results/findings within the past 24 hours.  "

## 2018-12-22 NOTE — ASSESSMENT & PLAN NOTE
38 M with h/o known tuberculum sella meningioma right pterional craniotomy for subtotal resection (12/19).    --Neurologically stable on exam.  --Continue subgaleal hemovac drain, will likely discontinue in am.  --Continue prophylactic antiobiotics while hemovac drain in place.  --Begin daily evaluation and treatment with PTOT.  --Begin glucocorticoid taper.  --Continue chemical PUD prophylaxis while receiving systemic glucocorticoids.  --We will continue to monitor closely, please contact us with any questions or concerns.

## 2018-12-22 NOTE — PROGRESS NOTES
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     Neurologically stable.        Post-Op Info:  Procedure(s) (LRB):  CRANIOTOMY, USING FRAMELESS STEREOTAXY, Meningioma (Right)   3 Days Post-Op         Medications:  Continuous Infusions:   sodium chloride 0.9% 100 mL/hr at 12/21/18 1358     Scheduled Meds:   cefTRIAXone (ROCEPHIN) IVPB  2 g Intravenous Q12H    dexamethasone  4 mg Intravenous Q6H    famotidine  20 mg Oral BID    heparin (porcine)  5,000 Units Subcutaneous Q8H    levetiracetam IVPB  500 mg Intravenous Q12H    mupirocin  1 g Nasal BID     PRN Meds:acetaminophen, acetaminophen, dextrose 50%, dextrose 50%, glucagon (human recombinant), glucose, glucose, hydrALAZINE, ondansetron, oxyCODONE     Review of Systems    Objective:     Weight: 135 kg (297 lb 9.9 oz)  Body mass index is 39.27 kg/m².  Vital Signs (Most Recent):  Temp: 98.2 °F (36.8 °C) (12/22/18 1600)  Pulse: 63 (12/22/18 1600)  Resp: 18 (12/22/18 1600)  BP: (!) 142/77 (12/22/18 1600)  SpO2: (!) 94 % (12/22/18 1600) Vital Signs (24h Range):  Temp:  [97 °F (36.1 °C)-98.5 °F (36.9 °C)] 98.2 °F (36.8 °C)  Pulse:  [58-77] 63  Resp:  [16-19] 18  SpO2:  [94 %-99 %] 94 %  BP: (120-142)/(73-81) 142/77     Date 12/22/18 0700 - 12/23/18 0659   Shift 5050-2773 8128-5320 2705-6634 24 Hour Total   INTAKE   Shift Total(mL/kg)       OUTPUT   Drains 5   5   Shift Total(mL/kg) 5(0)   5(0)   Weight (kg) 135 135 135 135                        Closed/Suction Drain 12/19/18 1312 Right  Accordion 10 Fr. (Active)   Site Description Unable to view 12/20/2018  8:00 AM   Dressing Type [facility] 12/20/2018  8:00 AM   Dressing Status Clean;Dry;Intact 12/20/2018  8:00 AM   Drainage Serosanguineous 12/20/2018  8:00 AM   Status Other (Comment) 12/20/2018  8:00 AM   Output (mL) 30 mL 12/20/2018  8:00 AM            " Urethral Catheter 12/19/18 0825 Straight-tip;Non-latex 16 Fr. (Active)   Site Assessment Clean;Intact 12/20/2018  8:00 AM   Collection Container Urimeter 12/20/2018  8:00 AM   Securement Method secured to top of thigh w/ adhesive device 12/20/2018  8:00 AM   Catheter Care Performed yes 12/20/2018  8:00 AM   Reason for Continuing Urinary Catheterization Post operative 12/20/2018  8:00 AM   CAUTI Prevention Bundle StatLock in place w 1" slack;Intact seal between catheter & drainage tubing;Drainage bag off the floor;Green sheeting clip in use;No dependent loops or kinks;Drainage bag not overfilled (<2/3 full);Drainage bag below bladder 12/20/2018  7:00 AM   Output (mL) 100 mL 12/20/2018  9:00 AM       Neurosurgery Physical Exam     Awake, alert, no acute distress  AOx3  R crani dressing c/d/i with HV  PERRL  EOMI  VF grossly intact  CN II-XII grossly intact  FCx4  No focal deficits    Significant Labs:  No results for input(s): GLU, NA, K, CL, CO2, BUN, CREATININE, CALCIUM, MG in the last 48 hours.  No results for input(s): WBC, HGB, HCT, PLT in the last 48 hours.  No results for input(s): LABPT, INR, APTT in the last 48 hours.  Microbiology Results (last 7 days)     ** No results found for the last 168 hours. **        Recent Lab Results       12/22/18  0524   12/21/18  2049        POCT Glucose 118 115           Significant Diagnostics:  I have reviewed all pertinent imaging results/findings within the past 24 hours.    Assessment/Plan:     Meningioma    38 M with h/o known tuberculum sella meningioma right pterional craniotomy for subtotal resection (12/19).    --Neurologically stable on exam.  --Continue subgaleal hemovac drain, will likely discontinue in am.  --Continue prophylactic antiobiotics while hemovac drain in place.  --Begin daily evaluation and treatment with PTOT.  --Begin glucocorticoid taper.  --Continue chemical PUD prophylaxis while receiving systemic glucocorticoids.  --We will continue to monitor " closely, please contact us with any questions or concerns.             Kike Elaine MD  Neurosurgery  [facility]

## 2018-12-23 VITALS
TEMPERATURE: 98 F | HEIGHT: 73 IN | DIASTOLIC BLOOD PRESSURE: 84 MMHG | RESPIRATION RATE: 18 BRPM | WEIGHT: 297.63 LBS | HEART RATE: 67 BPM | OXYGEN SATURATION: 97 % | SYSTOLIC BLOOD PRESSURE: 131 MMHG | BODY MASS INDEX: 39.44 KG/M2

## 2018-12-23 LAB
BLD PROD TYP BPU: NORMAL
BLD PROD TYP BPU: NORMAL
BLOOD UNIT EXPIRATION DATE: NORMAL
BLOOD UNIT EXPIRATION DATE: NORMAL
BLOOD UNIT TYPE CODE: 6200
BLOOD UNIT TYPE CODE: 6200
BLOOD UNIT TYPE: NORMAL
BLOOD UNIT TYPE: NORMAL
CODING SYSTEM: NORMAL
CODING SYSTEM: NORMAL
DISPENSE STATUS: NORMAL
DISPENSE STATUS: NORMAL
POCT GLUCOSE: 105 MG/DL (ref 70–110)
TRANS ERYTHROCYTES VOL PATIENT: NORMAL ML
TRANS ERYTHROCYTES VOL PATIENT: NORMAL ML

## 2018-12-23 PROCEDURE — 25000003 PHARM REV CODE 250: Performed by: NURSE PRACTITIONER

## 2018-12-23 PROCEDURE — 25000003 PHARM REV CODE 250: Performed by: STUDENT IN AN ORGANIZED HEALTH CARE EDUCATION/TRAINING PROGRAM

## 2018-12-23 PROCEDURE — 63600175 PHARM REV CODE 636 W HCPCS: Performed by: STUDENT IN AN ORGANIZED HEALTH CARE EDUCATION/TRAINING PROGRAM

## 2018-12-23 RX ORDER — FAMOTIDINE 20 MG/1
20 TABLET, FILM COATED ORAL 2 TIMES DAILY
Qty: 60 TABLET | Refills: 11 | Status: SHIPPED | OUTPATIENT
Start: 2018-12-23 | End: 2019-12-23

## 2018-12-23 RX ORDER — OXYCODONE HYDROCHLORIDE 5 MG/1
5 TABLET ORAL EVERY 4 HOURS PRN
Qty: 20 TABLET | Refills: 0 | Status: SHIPPED | OUTPATIENT
Start: 2018-12-23

## 2018-12-23 RX ORDER — LEVETIRACETAM 500 MG/1
500 TABLET ORAL 2 TIMES DAILY
Qty: 60 TABLET | Refills: 11 | Status: SHIPPED | OUTPATIENT
Start: 2018-12-23 | End: 2019-12-23

## 2018-12-23 RX ORDER — DEXAMETHASONE 4 MG/1
4 TABLET ORAL SEE ADMIN INSTRUCTIONS
Qty: 60 TABLET | Refills: 0 | Status: SHIPPED | OUTPATIENT
Start: 2018-12-23 | End: 2019-01-02

## 2018-12-23 RX ADMIN — MUPIROCIN 1 G: 20 OINTMENT TOPICAL at 09:12

## 2018-12-23 RX ADMIN — DEXAMETHASONE 4 MG: 4 TABLET ORAL at 05:12

## 2018-12-23 RX ADMIN — OXYCODONE HYDROCHLORIDE 5 MG: 5 TABLET ORAL at 11:12

## 2018-12-23 RX ADMIN — LEVETIRACETAM 500 MG: 5 INJECTION INTRAVENOUS at 09:12

## 2018-12-23 RX ADMIN — CEFTRIAXONE 2 G: 2 INJECTION, SOLUTION INTRAVENOUS at 11:12

## 2018-12-23 RX ADMIN — HEPARIN SODIUM 5000 UNITS: 5000 INJECTION, SOLUTION INTRAVENOUS; SUBCUTANEOUS at 05:12

## 2018-12-23 RX ADMIN — FAMOTIDINE 20 MG: 20 TABLET ORAL at 09:12

## 2018-12-23 NOTE — NURSING
Patient awake, alert, responsive. Walking with stable gait. Vital signs stable. Medications administered as scheduled. Discharge instructions explained to patient. IV discontinued . Left the unit at 13:10 pm, accompanied by wife.

## 2018-12-23 NOTE — PLAN OF CARE
Problem: Adult Inpatient Plan of Care  Goal: Plan of Care Review  Outcome: Ongoing (interventions implemented as appropriate)  Plan of care, medications, education reviewed with patient and questions answered, both spouse and patient verbalized understanding. Patient AAOx4, BP remained below SBP parameter of 160. Skin remains without complications and intact, Incision to Right head remains covered by dressing, hemovac drain in place having no output throughout shift. NaCl fluids infusing at 100 mL/hr to L hand IV. Patient remains with controlled pain management, requesting no medication throughout shift. O2 and suction at bedside, bed alarm active and explained, bed in lowest position, call bell in reach. WCTM.

## 2018-12-23 NOTE — DISCHARGE SUMMARY
[facility]  Neurosurgery  Discharge Summary      Patient Name: Salvatore Figueroa  MRN: [id number]  Admission Date: 12/19/2018  Hospital Length of Stay: 4 days  Discharge Date and Time:  12/23/2018 9:01 AM  Attending Physician: Miller Tucker MD   Discharging Provider: Kike Elaine MD  Primary Care Provider: Michael Dela Cruz MD    HPI:   39 yo male with known tuberculum sella meningioma presenting for elective craniotomy for resection.     Neurologically stable.        Procedure(s) (LRB):  CRANIOTOMY, USING FRAMELESS STEREOTAXY, Meningioma (Right)     Hospital Course: 12/20: POD#1 R pterional crani for resection.     Consults:     Significant Diagnostic Studies: Labs: All labs within the past 24 hours have been reviewed    Pending Diagnostic Studies:     None        Final Active Diagnoses:    Diagnosis Date Noted POA    PRINCIPAL PROBLEM:  Mass [R22.9] 12/19/2018 Yes    Leukocytosis [D72.829] 12/20/2018 No    Fever [R50.9] 12/20/2018 No    Meningioma [D32.9] 12/19/2018 Yes    Essential hypertension [I10] 12/19/2018 Yes      Problems Resolved During this Admission:      Discharged Condition: good    Disposition: Home or Self Care    Follow Up:    Patient Instructions:      Diet Adult Regular     Notify your health care provider if you experience any of the following:  temperature >100.4     Notify your health care provider if you experience any of the following:  persistent nausea and vomiting or diarrhea     Notify your health care provider if you experience any of the following:  severe uncontrolled pain     Notify your health care provider if you experience any of the following:  redness, tenderness, or signs of infection (pain, swelling, redness, odor or green/yellow discharge around incision site)     Notify your health care provider if you experience any of the following:  difficulty breathing or increased cough     Notify your health care provider if you experience any of the  following:  severe persistent headache     Notify your health care provider if you experience any of the following:  worsening rash     Notify your health care provider if you experience any of the following:  persistent dizziness, light-headedness, or visual disturbances     Notify your health care provider if you experience any of the following:  increased confusion or weakness     Activity as tolerated     Medications:  Reconciled Home Medications:      Medication List      START taking these medications    dexamethasone 4 MG Tab  Commonly known as:  DECADRON  Take 1 tablet (4 mg total) by mouth As instructed (12/23 and 24: Take one tab (4mg) three times daily. 12/25 and 26: Take 1.5 tabs (3mg) three times daily. 12/27: Take 1.5 tabs (3mg) twice daily.12/28: Take 1.5 tabs (3mg) twice daily.12/29: Take 0.5 tabs (2mg) twice daily.12/30: Take 0.5 tabs (2mg) twice daily. 12/31: Take 0.5 tabs (2mg) once daily. 1/1,2, and 3: Take 0.5 tabs (2mg) once daily.).     famotidine 20 MG tablet  Commonly known as:  PEPCID  Take 1 tablet (20 mg total) by mouth 2 (two) times daily.     levETIRAcetam 500 MG Tab  Commonly known as:  KEPPRA  Take 1 tablet (500 mg total) by mouth 2 (two) times daily.     oxyCODONE 5 MG immediate release tablet  Commonly known as:  ROXICODONE  Take 1 tablet (5 mg total) by mouth every 4 (four) hours as needed.            Kike Elaine MD  Neurosurgery  [facility]

## 2018-12-23 NOTE — DISCHARGE INSTRUCTIONS
Please follow ONLY the instructions that are checked below.    Activity Restrictions:  [x]  Return to work will be determined on an individual basis.  [x]  No lifting greater than 10 pounds.  [x]  No driving or operating machinery:  [x]  until cleared by your surgeon.  [x]  while taking narcotic pain medications or muscle relaxants.  [x]  Increase ambulation over the next 2 weeks so that you are walking 2 miles per day at 2 weeks post-operatively.  [x]  Walk on paved surfaces only. It is okay to walk up and down stairs while holding onto a side rail.  [x]  No sexual activity for 2-3 weeks.    Discharge Medication/Follow-up:  [x]  Please refer to discharge medication reconciliation form.  [x]  Do not take ANY non-steroidal anti-inflammatory drugs (NSAIDS), including the following: ibuprofen, naprosyn, Aleve, Advil, Indocin, Mobic, or Celebrex for:  [x]  4 weeks  [x]  Prescriptions for appropriate medication will be given upon discharge.  [x]  Take docusate (Colace 100 mg): take one capsule a day as needed for constipation. You can get this over the counter.  [x]  Follow-up appointment:   [x]  10-14 days post-op for wound check by physician assistant/nurse  [x]  An appointment will be mailed to you.    Wound Care:  [x]  No bandage required. Keep your incision open to the air.  [x]  You may shower on the 2nd day after your surgery. Have the force of water hit you opposite from the incision. Pat the incision dry after your shower; do not scrub the incision.  [x]  You cannot take a bath until 8 weeks after surgery.      Call your doctor or go to the Emergency Room for any signs of infection, including: increased redness, drainage, pain, or fever (temperature ?101.5 for 24 hours). Call your doctor or go to the Emergency Room if there are any localized neurological changes; problems with speech, vision, numbness, tingling, weakness, or severe headache; or for other concerns.    Special Instructions:  [x]  No use of tobacco  products.  [x]  Diet: Please eat a regular diet as tolerated.    Physicians need 3 days' notice for pain medicine to be refilled. Pain medicine will only be refilled between 8 AM and 5 PM, Monday through Friday, due to Food and Drug Administration regulation of documentation.    If you have any questions about this form, please call [phone].    Form No. 44369 (Revised 10/31/2013)

## 2018-12-24 ENCOUNTER — TELEPHONE (OUTPATIENT)
Dept: NEUROSURGERY | Facility: CLINIC | Age: 38
End: 2018-12-24

## 2018-12-24 NOTE — TELEPHONE ENCOUNTER
----- Message from Donaldo Mckeon sent at 12/24/2018  8:40 AM CST -----  Contact: Patient @ [phone]  Patient requesting a return call from  about discharge instruction, rosa call

## 2018-12-24 NOTE — OP NOTE
DATE OF PROCEDURE:  12/19/2018    ATTENDING PHYSICIAN:  Miller Tucker M.D.    PREOPERATIVE DIAGNOSIS:  Tuberculum sellae meningioma.    POSTOPERATIVE DIAGNOSIS:  Tuberculum sellae meningioma.    PROCEDURES PERFORMED:  Right frontotemporal craniotomy, excision of the   tuberculum sellae meningioma with neuronavigation and microsurgery.    SURGEON:  Miller Tucker M.D.    ASSISTANT:  Kike Elaine M.D. ([facility]).    ANESTHESIA:  General endotracheal.    ESTIMATED BLOOD LOSS:  300 mL.    DRAINS:  Hemovac subgaleal.    CONDITION AT THE END OF PROCEDURE:  Satisfactory.    BRIEF HISTORY:  This 38-year-old man complained of headaches and erectile   dysfunction.  Hormonal study showed a mildly elevated prolactin level and MRI   was done at [facility] in [place] showing a tuberculum   sellae meningioma displacing the optic nerves laterally.  The patient has no   specific visual complaints, although formal visual fields were not done.    Considering his young age, it was felt that the tumor should be removed before   he experienced visual loss.    PROCEDURE IN DETAIL:  The patient was taken to MRI where navigation scan was   done.  He was then brought to the Operating Room in supine position under   premedication, intubated and induced with general anesthesia.  A cannula was   placed in the left radial artery for continuous blood pressure monitoring.  A   Mcfarland catheter inserted, sequential compress devices applied to legs and various   intravenous lines started.  The head was somewhat elevated, turned to the left   and immobilized with the Parekh three-point skeletal fixation device.  The   navigation arc was attached to the Parekh head rest and the head registered to   the system with surface recognition.  The pterion, zygoma, location of the   tumor were marked on the scalp.  The hair was shaved in the right frontotemporal   area and this portion of the scalp prepped with  Betadine and draped in a   sterile fashion.  A curvilinear beginning at the zygoma coming up behind the   hairline to midline was outlined and this was injected with 1% Xylocaine and   epinephrine.  Incision was carried through the skin and galea, bleeding   controlled in the skin margin and skin flap with Kylah clips.  The skin was   undermined in the subgaleal space down to the temporal line.  The temporalis   fascia was cut from the zygoma along the temporal line to the zygomatic process   of the frontal bone and elevated with the skin and retracted with fishhook   retractors.  The temporalis muscle was detached from the temporal line with the   Bovie cutting current, elevated and retracted inferiorly with the fishhook   retractors.  A standard pterional craniotomy was outlined with the Bovie cutting   current, cutting the pericranium and muscle.  Kahlil holes were placed in the   zygomatic process of the frontal bone and on the temporal squama and the   craniotomy cut with the high-speed drill, elevated and removed from the   operative field.  The temporal squama and sphenoid ridge were drilled down flat   to make the approach to the sylvian fissure.  More direct drill holes were made   in the bone margin.  The dura tacked up to these with 4-0 Nurolon sutures.  The   Gonzales retractor was affixed to the [place] headrest, clean towels placed   around the craniotomy opening and the operating microscope brought into the   field.    The dura was opened with a curvilinear incision and retracted inferiorly with   4-0 Nurolon sutures.  The right sylvian fissure was opened with microsurgical   technique.  It was necessary to take one crossing vein to gain adequate exposure   to the carotid bifurcation.  The cosmetic and carotid cisterns were opened and   medial to the right optic nerve and extending under it was a fairly typical   appearing meningioma.  The frontal lobe was gently retracted to expose this   area.  A  piece of the tumor was taken and sent to the laboratory was consistent   with meningioma.  Microdissection was then used to create some separation   between the right optic nerve and the tumor.  The top of the tumor was   coagulated and the Sonopet ultrasonic aspirator used to begin debulking the   inner core of the tumor.  The tumor was fairly broad based on the tuberculum and   jugum sphenoidale and was difficult to mobilize.  The tumor did come back and   displaced the chiasm back.  This was not apparent on MRI.  As the tumor was   debulked, it could be dissected off of the left optic nerve bringing it down   from underneath the nerve.  This was somewhat more difficult on the right side   as from this angle of the nerve was crossing the tumor.  The tumor was detached   somewhat from the underside of the nerve, but was adherent at the optic foramen.    Working between the carotid artery and optic nerve, the tumor could be seen   protruding under and some of this could be pushed back under.  It was decided to   partially decompress the optic foramen and the 1 mm Kerrison was used to remove   bone over the optic nerve to allow some more mobility.  With this, the tumor   could be pulled down with a curette and gradually removed.  The origin of the   tumor along the tuberculum sellae was coagulated.  The tumor appeared grossly   removed at the end of the procedure.  Neuromonitoring was done and remained   normal through the operation.  A visual-evoked responses were not done, however,   as these have not been proved to be reliable.  With the tumor removed, small   bleeding points were controlled with bipolar coagulation and the wound   thoroughly irrigated.  Some Surgicel was placed over the tuberculum sellae and   over the sylvian fissure.  The dura was closed with interrupted 4-0 Nurolon   sutures.  A piece of Duragen placed over this.  The bone flaps returned in place   using the DogSpot system with  three four-sided boxes.  The   temporalis muscle and fascia were closed with interrupted 3-0 Vicryl sutures.    Hemovac drain was placed in subgaleal space and brought through a separate stab   incision posterior to the primary incision.  The galea was closed with inverted   interrupted 3-0 Vicryl sutures and the skin closed with skin staples.  A Telfa   bacitracin was placed over this, held in place with tape.  The patient's head   was released from three-point skeletal fixation device.  He was returned to full   supine position, allowed to awaken from anesthesia, extubated and left the   Operating Room in satisfactory condition.  He received 2 g of Rocephin, 500 mg   of Dilantin and 12 mg of Decadron at the beginning of the procedure and   bacitracin-containing antibiotic irrigating solutions were used throughout.      ARTURO/IN  dd: 12/21/2018 13:20:51 (CST)  td: 12/21/2018 19:28:32 (CST)  Doc ID   #[id number]  Job ID #[id number]    CC:

## 2018-12-31 ENCOUNTER — TELEPHONE (OUTPATIENT)
Dept: NEUROSURGERY | Facility: CLINIC | Age: 38
End: 2018-12-31

## 2018-12-31 NOTE — TELEPHONE ENCOUNTER
I returned the pt wife call and informed her that per  he would like the pt to wait until after he has been evaluated to determine if Neuro ophthalmology is needed.  ----- Message from Ermias Leslie sent at 12/31/2018 12:14 PM CST -----  Contact: Pearl (Wife) [phone]  Needs Advice    Reason for call: Please contact the patient regarding his right eye.It's still blurry.        Communication Preference:PHONE     Additional Information:

## 2019-01-03 ENCOUNTER — OFFICE VISIT (OUTPATIENT)
Dept: NEUROSURGERY | Facility: CLINIC | Age: 39
End: 2019-01-03

## 2019-01-03 VITALS
HEART RATE: 78 BPM | WEIGHT: 288.19 LBS | TEMPERATURE: 99 F | BODY MASS INDEX: 36.99 KG/M2 | SYSTOLIC BLOOD PRESSURE: 118 MMHG | HEIGHT: 74 IN | DIASTOLIC BLOOD PRESSURE: 71 MMHG

## 2019-01-03 DIAGNOSIS — D32.9 MENINGIOMA: Primary | ICD-10-CM

## 2019-01-03 PROCEDURE — 99024 POSTOP FOLLOW-UP VISIT: CPT | Mod: ,,, | Performed by: NEUROLOGICAL SURGERY

## 2019-01-03 PROCEDURE — 99024 PR POST-OP FOLLOW-UP VISIT: ICD-10-PCS | Mod: ,,, | Performed by: NEUROLOGICAL SURGERY

## 2019-01-03 PROCEDURE — 99999 PR PBB SHADOW E&M-EST. PATIENT-LVL III: CPT | Mod: PBBFAC,,, | Performed by: NEUROLOGICAL SURGERY

## 2019-01-03 PROCEDURE — 99213 OFFICE O/P EST LOW 20 MIN: CPT | Mod: PBBFAC | Performed by: NEUROLOGICAL SURGERY

## 2019-01-03 PROCEDURE — 99999 PR PBB SHADOW E&M-EST. PATIENT-LVL III: ICD-10-PCS | Mod: PBBFAC,,, | Performed by: NEUROLOGICAL SURGERY

## 2019-01-03 NOTE — PROGRESS NOTES
This office note has been dictated.  January 3, 2019    Allan Bauer M.D.  [address], [address]  [place] [address]    RE:  KARISSA FIGUEROA  [facility]  [id number]    Dear Allan:    Karissa Figueroa returned in neurosurgical followup to the office this morning.  He   was admitted to [facility] and underwent right frontotemporal craniotomy   and excision of the tuberculum sella meningioma on 12/19/18.  It seemed possible   to get a good resection of the tumor.  Vision seemed generally stable in the   postop period.  He was discharged. However, after going home, he experienced   some feeling of pressure behind his right eye and felt that his vision was a   little blurred or hazy.  This seems to be resolving, although he does not feel   that his vision is completely back to normal.  He returns today for staple   removal.    On brief examination, he is alert and oriented and speaking clearly.  His   incision is well healed and the staples were removed.  He shows good extraocular   movements without nystagmus.  Pupils are equal and briskly reactive to light.    Fundi show clear disk margins.  He saw fingers moving in both visual fields.  He   shows no focal weakness and seems otherwise neurologically intact.    He is anxious to get back to work.  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  I would like to have a   baseline MRI done in about three months and we will see him then.    Again, I very much appreciate working with you in his care.    Sincerely yours,            ARTURO/WHITNEY  dd: 01/03/2019 12:07:46 (CST)  td: 01/04/2019 07:32:17 (CST)  Doc ID   #[id number]  Job ID #[id number]    CC: Karissa Figueroa

## 2019-04-29 ENCOUNTER — HOSPITAL ENCOUNTER (OUTPATIENT)
Dept: RADIOLOGY | Facility: HOSPITAL | Age: 39
Discharge: HOME | End: 2019-04-29
Attending: NEUROLOGICAL SURGERY
Payer: COMMERCIAL

## 2019-04-29 ENCOUNTER — OFFICE VISIT (OUTPATIENT)
Dept: NEUROSURGERY | Facility: CLINIC | Age: 39
End: 2019-04-29
Payer: COMMERCIAL

## 2019-04-29 VITALS — BODY MASS INDEX: 36.96 KG/M2 | TEMPERATURE: 100 F | WEIGHT: 288 LBS | HEIGHT: 74 IN

## 2019-04-29 DIAGNOSIS — D32.9 MENINGIOMA: Primary | ICD-10-CM

## 2019-04-29 DIAGNOSIS — D32.9 MENINGIOMA: ICD-10-CM

## 2019-04-29 PROCEDURE — 99213 OFFICE O/P EST LOW 20 MIN: CPT | Mod: S$GLB,,, | Performed by: NEUROLOGICAL SURGERY

## 2019-04-29 PROCEDURE — 3008F PR BODY MASS INDEX (BMI) DOCUMENTED: ICD-10-PCS | Mod: CPTII,S$GLB,, | Performed by: NEUROLOGICAL SURGERY

## 2019-04-29 PROCEDURE — 25500020 PHARM REV CODE 255: Performed by: NEUROLOGICAL SURGERY

## 2019-04-29 PROCEDURE — 70553 MRI BRAIN W WO CONTRAST: ICD-10-PCS | Mod: 26,,, | Performed by: RADIOLOGY

## 2019-04-29 PROCEDURE — 99999 PR PBB SHADOW E&M-EST. PATIENT-LVL III: CPT | Mod: PBBFAC,,, | Performed by: NEUROLOGICAL SURGERY

## 2019-04-29 PROCEDURE — 3008F BODY MASS INDEX DOCD: CPT | Mod: CPTII,S$GLB,, | Performed by: NEUROLOGICAL SURGERY

## 2019-04-29 PROCEDURE — 99213 PR OFFICE/OUTPT VISIT, EST, LEVL III, 20-29 MIN: ICD-10-PCS | Mod: S$GLB,,, | Performed by: NEUROLOGICAL SURGERY

## 2019-04-29 PROCEDURE — A9585 GADOBUTROL INJECTION: HCPCS | Performed by: NEUROLOGICAL SURGERY

## 2019-04-29 PROCEDURE — 70553 MRI BRAIN STEM W/O & W/DYE: CPT | Mod: 26,,, | Performed by: RADIOLOGY

## 2019-04-29 PROCEDURE — 70553 MRI BRAIN STEM W/O & W/DYE: CPT | Mod: TC

## 2019-04-29 PROCEDURE — 99999 PR PBB SHADOW E&M-EST. PATIENT-LVL III: ICD-10-PCS | Mod: PBBFAC,,, | Performed by: NEUROLOGICAL SURGERY

## 2019-04-29 RX ORDER — GADOBUTROL 604.72 MG/ML
10 INJECTION INTRAVENOUS
Status: COMPLETED | OUTPATIENT
Start: 2019-04-29 | End: 2019-04-29

## 2019-04-29 RX ADMIN — GADOBUTROL 10 ML: 604.72 INJECTION INTRAVENOUS at 12:04

## 2019-04-29 NOTE — PROGRESS NOTES
This office note has been dictated.                  4/29/19    Allan Bauer M.D.  [address], [address]  [place] [address]      RE:  KARISSA FIGUEROA  [facility]  [id number]      Dear Allan:    Karissa Figueroa was seen in neurosurgical followup at the office today.  He has   been doing well over the last three months.  He is back at work.  He feels that   his vision is stable and at this point has no visual complaints.  He does say   that on occasion short-term memory may be impaired.  Somebody will say something   to him and he has to ask a moment later what was said.  He has had no focal   weakness or numbness in extremities, difficulty with gait or balance.    On examination today, his craniotomy is well healed.  He has full extraocular   movements, equal, reactive pupils.  No evidence for visual field loss.  Speech   is clear.  He shows no weakness in the extremities.    MRI of the brain was repeated at [facility] today and compared to his preop   study.  The suprasellar meningioma has been removed and the optic nerves and   chiasm have come down.  There is some residual enhancement along the tuberculum   and jugum sphenoidale which may represent dural invasion of meningioma.  This   can be followed up with MRI.    I will plan to bring him back this December, which will be one year postop for   MRI of the brain and we can plan from there.    Thank you again for the opportunity to work with you in his care.    Sincerely yours,                ARTURO/WHITNEY  dd: 04/29/2019 13:54:59 (CDT)  td: 04/30/2019 10:50:41 (CDT)  Doc ID   #[id number]  Job ID #[id number]    CC: Karissa Figueroa

## 2019-11-14 ENCOUNTER — TELEPHONE (OUTPATIENT)
Dept: NEUROSURGERY | Facility: CLINIC | Age: 39
End: 2019-11-14

## 2019-11-14 DIAGNOSIS — D32.9 MENINGIOMA: Primary | ICD-10-CM

## 2019-12-23 ENCOUNTER — HOSPITAL ENCOUNTER (OUTPATIENT)
Dept: RADIOLOGY | Facility: HOSPITAL | Age: 39
Discharge: HOME | End: 2019-12-23
Attending: NEUROLOGICAL SURGERY
Payer: COMMERCIAL

## 2019-12-23 ENCOUNTER — OFFICE VISIT (OUTPATIENT)
Dept: NEUROSURGERY | Facility: CLINIC | Age: 39
End: 2019-12-23
Payer: COMMERCIAL

## 2019-12-23 VITALS
TEMPERATURE: 99 F | SYSTOLIC BLOOD PRESSURE: 116 MMHG | HEIGHT: 74 IN | HEART RATE: 81 BPM | DIASTOLIC BLOOD PRESSURE: 81 MMHG | BODY MASS INDEX: 36.96 KG/M2 | WEIGHT: 288 LBS

## 2019-12-23 DIAGNOSIS — D32.9 MENINGIOMA: ICD-10-CM

## 2019-12-23 DIAGNOSIS — D32.9 MENINGIOMA: Primary | ICD-10-CM

## 2019-12-23 PROCEDURE — 99999 PR PBB SHADOW E&M-EST. PATIENT-LVL III: ICD-10-PCS | Mod: PBBFAC,,, | Performed by: NEUROLOGICAL SURGERY

## 2019-12-23 PROCEDURE — 99213 OFFICE O/P EST LOW 20 MIN: CPT | Mod: S$GLB,,, | Performed by: NEUROLOGICAL SURGERY

## 2019-12-23 PROCEDURE — 70553 MRI BRAIN STEM W/O & W/DYE: CPT | Mod: TC

## 2019-12-23 PROCEDURE — 70553 MRI BRAIN STEM W/O & W/DYE: CPT | Mod: 26,,, | Performed by: RADIOLOGY

## 2019-12-23 PROCEDURE — 99213 PR OFFICE/OUTPT VISIT, EST, LEVL III, 20-29 MIN: ICD-10-PCS | Mod: S$GLB,,, | Performed by: NEUROLOGICAL SURGERY

## 2019-12-23 PROCEDURE — 25500020 PHARM REV CODE 255: Performed by: NEUROLOGICAL SURGERY

## 2019-12-23 PROCEDURE — 70553 MRI BRAIN W WO CONTRAST: ICD-10-PCS | Mod: 26,,, | Performed by: RADIOLOGY

## 2019-12-23 PROCEDURE — 3008F BODY MASS INDEX DOCD: CPT | Mod: CPTII,S$GLB,, | Performed by: NEUROLOGICAL SURGERY

## 2019-12-23 PROCEDURE — A9585 GADOBUTROL INJECTION: HCPCS | Performed by: NEUROLOGICAL SURGERY

## 2019-12-23 PROCEDURE — 3008F PR BODY MASS INDEX (BMI) DOCUMENTED: ICD-10-PCS | Mod: CPTII,S$GLB,, | Performed by: NEUROLOGICAL SURGERY

## 2019-12-23 PROCEDURE — 99999 PR PBB SHADOW E&M-EST. PATIENT-LVL III: CPT | Mod: PBBFAC,,, | Performed by: NEUROLOGICAL SURGERY

## 2019-12-23 RX ORDER — FLUOXETINE HYDROCHLORIDE 20 MG/1
CAPSULE ORAL
COMMUNITY
Start: 2019-12-19

## 2019-12-23 RX ORDER — GADOBUTROL 604.72 MG/ML
10 INJECTION INTRAVENOUS
Status: COMPLETED | OUTPATIENT
Start: 2019-12-23 | End: 2019-12-23

## 2019-12-23 RX ADMIN — GADOBUTROL 10 ML: 604.72 INJECTION INTRAVENOUS at 08:12

## 2019-12-23 NOTE — PROGRESS NOTES
This office note has been dictated.  December 23, 2019    Allan Bauer M.D.  [address], [address]  [place] [address]    RE:   KARISSA FIGUEROA  [facility].:  [id number]      Dear Allan:    Karissa Figueroa returned in neurosurgical followup to the office today.  He has   continued to do well over the last several months.  He is now working out in   [facility].  He has had no restrictions on his work.  He feels that his vision   is stable.  His memory issues seem pretty well resolved.  He does note some   bilateral loss of hearing.    On brief examination today, his craniotomy incision remains well healed.    Extraocular movements are good and pupils are equal and reactive to light.    Fundi show clear disk margins.  Visual fields appear intact.  He is speaking   clearly, stood and walked without difficulty and seems otherwise neurologically   intact.    MRI of the brain was done at [facility] this morning and compared to his   previous studies and again is noted resection of the tuberculum sellae   meningioma.  The infundibulum and pituitary stalk are midline and the optic   nerves are well decompressed.  There remains some enhancement along the dura of   the tuberculum.  There is no evidence for regrowth of tumor.    I am happy to see him doing well.  I will plan to have him followed up yearly   for the next two years and then with MRI at five years to look for any regrowth.    Again, I appreciate working with you in his care.    Sincerely yours,        ARTURO/WHITNEY  dd: 12/23/2019 14:20:57 (CST)  td: 12/23/2019 22:59:46 (CST)  Doc ID   #[id number]  Job ID #[id number]    CC: Karissa Figueroa

## 2020-12-04 DIAGNOSIS — Z01.84 ANTIBODY RESPONSE EXAMINATION: ICD-10-CM

## 2022-02-12 DIAGNOSIS — U07.1 COVID-19 VIRUS DETECTED: ICD-10-CM

## (undated) DEVICE — HEMOSTAT SURGICEL 4X8IN

## (undated) DEVICE — DIFFUSER

## (undated) DEVICE — DRESSING MEPORE PRO 6 X 7 CM

## (undated) DEVICE — MARKERS SPHERZ PASSIVE

## (undated) DEVICE — CARTRIDGE OIL

## (undated) DEVICE — RUBBERBAND STERILE 3X1/8IN

## (undated) DEVICE — DRESSING SURGICAL 1X3

## (undated) DEVICE — Device

## (undated) DEVICE — SEE MEDLINE ITEM 154981

## (undated) DEVICE — ROUTER TAPERED 2.3MM

## (undated) DEVICE — DRAPE THYROID WITH ARMBOARD

## (undated) DEVICE — BIT DRILL WIRE PASS 1.0MM

## (undated) DEVICE — DRESSING SURGICAL 1X1

## (undated) DEVICE — CONTAINER SPECIMEN STRL 4OZ

## (undated) DEVICE — TAPE SURG MEDIPORE 6X72IN

## (undated) DEVICE — KIT EVACUATOR 3-SPRING 1/8 DRN

## (undated) DEVICE — SUT 4/0 18IN NUROLON BLK B

## (undated) DEVICE — SEE MEDLINE ITEM 156905

## (undated) DEVICE — HOOK STAY ELAS 5MM 8EA/PK

## (undated) DEVICE — SPONGE GAUZE 16PLY 4X4

## (undated) DEVICE — ELECTRODE REM PLYHSV RETURN 9

## (undated) DEVICE — SPONGE NEURO 1/4X1/4

## (undated) DEVICE — DRAPE INCISE IOBAN 2 23X17IN

## (undated) DEVICE — MARKER SKIN STND TIP BLUE BARR

## (undated) DEVICE — BLADE SURG CARBON STEEL SZ11

## (undated) DEVICE — SEE MEDLINE ITEM 152622

## (undated) DEVICE — TUBE FRAZIER 5MM 2FT SOFT TIP

## (undated) DEVICE — SUT VICRYL PLUS 3-0 SH 18IN

## (undated) DEVICE — KIT POWDER ABSORBABLE GELATIN

## (undated) DEVICE — STOCKINET 4INX48

## (undated) DEVICE — DRESSING ADH FILM TELFA 2X3IN

## (undated) DEVICE — SEE MEDLINE ITEM 146292

## (undated) DEVICE — WARMER DRAPE STERILE LF

## (undated) DEVICE — TIP ASPIRATOR STRAIGHT MICRO D

## (undated) DEVICE — BUR BONE CUT MICRO TPS 3X3.8MM

## (undated) DEVICE — DRAPE OPMI STERILE

## (undated) DEVICE — DRESSING SURGICAL 1/2X1/2

## (undated) DEVICE — CLIPS RANEY SCALP FFS/ASSY

## (undated) DEVICE — CORD BIPOLAR 12 FOOT